# Patient Record
Sex: FEMALE | Race: WHITE | Employment: OTHER | URBAN - METROPOLITAN AREA
[De-identification: names, ages, dates, MRNs, and addresses within clinical notes are randomized per-mention and may not be internally consistent; named-entity substitution may affect disease eponyms.]

---

## 2017-01-30 ENCOUNTER — TELEPHONE (OUTPATIENT)
Dept: FAMILY MEDICINE | Facility: OTHER | Age: 62
End: 2017-01-30

## 2017-01-30 NOTE — Clinical Note
[facility]  [id number] [place] [address]  Phone: [phone]  January 30, 2017      Addis Ferreira  [address] [address] [address]      Dear Addis,    We care about your health and have reviewed your health plan including your medical conditions, medications, and lab results.  Based on this review, it is recommended that you follow up regarding the following health topic(s):  -Breast Cancer Screening  -Colon Cancer Screening    We recommend you take the following action(s):  -schedule a MAMMOGRAM which is due. Please disregard this reminder if you have had this exam elsewhere within the last 1-2 years please let us know so we can update your records.  -schedule a COLONOSCOPY to look for colon cancer (due every 10 years or 5 years in higher risk situations.)  Colonoscopies can prevent 90-95% of colon cancer deaths.  Problem lesions can be removed before they ever become cancer.  If you do not wish to do a colonoscopy or cannot afford to do one at this time, there is another option called a Fecal Immunochemical Occult Blood Test (FIT) a take home stool sample kit.  It does not replace the colonoscopy for colorectal cancer screening, but it can detect hidden bleeding in the lower colon.  It does need to be repeated every year and if a positive result is obtained, you would be referred for a colonoscopy.  If you have completed either one of these tests at another facility, please have the records sent to our clinic for our records.     Please call us at the [facility] - [phone] (or use [employer]) to address the above recommendations.     Thank you for trusting [facility] and we appreciate the opportunity to serve you.  We look forward to supporting your healthcare needs in the future.    Healthy Regards,    Your Health Care Team  [facility]

## 2017-01-30 NOTE — TELEPHONE ENCOUNTER
Panel Management Review      Patient has the following on her problem list:     Hypertension   Last three blood pressure readings:  BP Readings from Last 3 Encounters:   01/14/16 135/80   08/31/15 140/80   03/19/15 130/76     Blood pressure: Passed    HTN Guidelines:  Age 18-59 BP range:  Less than 140/90  Age 60-85 with Diabetes:  Less than 140/90  Age 60-85 without Diabetes:  less than 150/90      Composite cancer screening  Chart review shows that this patient is due/due soon for the following Mammogram and Fecal Colorectal (FIT)  Summary:    Patient is due/failing the following:   BMP, FIT and MAMMOGRAM    Action needed:   Patient needs non-fasting lab only appointment and schedule a mammogram and complete a FIT test     Type of outreach:    Sent letter.    Questions for provider review:    None                                                                                                                                    Chica King       Chart routed to Care Team .

## 2017-03-15 NOTE — PROGRESS NOTES
SUBJECTIVE:                                                    Addis Ferreira is a 62 year old female who presents to clinic today for the following health issues:      Hypertension Follow-up      Outpatient blood pressures are not being checked.    Low Salt Diet: no but trying, cooks from scratched, tries to stay away from processed foods.     BP high today.  Not checking elsewhere.  Denies side effects other than mild ACE cough.  Seems to improve if she hydrates better.      Getting her female hormones from her other physician, in [place] (OB/Gyn infertility)    Has a tooth that needs to be filled.  Has something wrong with her eyes.  Has a lump on her neck that has been there for a while.  This was previously partially removed, but has recurred.      Had a change in vision, has been some time.  Needs to see eye doctor.      Amount of exercise or physical activity: Works on her feet    Problems taking medications regularly: No    Medication side effects: none    Diet: regular (no restrictions)      Problem list and histories reviewed & adjusted, as indicated.  Additional history: as documented    Current Outpatient Prescriptions   Medication Sig Dispense Refill     lisinopril (PRINIVIL,ZESTRIL) 30 MG tablet Take 1 tablet (30 mg) by mouth daily 90 tablet 3     hydrochlorothiazide (HYDRODIURIL) 25 MG tablet Take 1 tablet (25 mg) by mouth daily 90 tablet 3     norethindrone-ethinyl estradiol (FEMHRT 1/5) 1-5 MG-MCG per tablet Take 1 tablet by mouth daily. 3 Package 3     conjugated estrogens (PREMARIN) vaginal cream Place  vaginally three times a week. 42.5 g 12     MULTI-DAY OR TABS   0     Recent Labs   Lab Test  01/14/16   0831  01/15/15   1028  01/15/15   1013  01/02/14   0917   LDL  119*  120   --   123   HDL  40*  42*   --   40*   TRIG  220*  207*   --   186*   ALT   --    --   25  27   CR  0.85   --   0.85  0.84   GFRESTIMATED  69   --   69  70   GFRESTBLACK  83   --   83  85   POTASSIUM  4.0   --   4.0   "4.4      BP Readings from Last 3 Encounters:   03/23/17 144/82   01/14/16 135/80   08/31/15 140/80    Wt Readings from Last 3 Encounters:   03/23/17 221 lb 3.2 oz (100.3 kg)   01/14/16 220 lb 4.8 oz (99.9 kg)   08/31/15 227 lb (103 kg)                  Reviewed and updated as needed this visit by clinical staff       Reviewed and updated as needed this visit by Provider         ROS:  Constitutional, HEENT, cardiovascular, pulmonary, gi and gu systems are negative, except as otherwise noted.    OBJECTIVE:                                                    /82 (BP Location: Right arm, Patient Position: Chair, Cuff Size: Adult Large)  Pulse 64  Temp 98.1  F (36.7  C) (Temporal)  Resp 16  Ht 5' 2.5\" (1.588 m)  Wt 221 lb 3.2 oz (100.3 kg)  LMP 06/30/2003  BMI 39.81 kg/m2  Body mass index is 39.81 kg/(m^2).  GENERAL: healthy, alert and no distress  EYES: suspicious for cataract in right eye.  NECK: cystic mass in right neck c/w hidradenoma previously identified  RESP: lungs clear to auscultation - no rales, rhonchi or wheezes  CV: regular rate and rhythm, normal S1 S2, no S3 or S4, no murmur, click or rub, no peripheral edema and peripheral pulses strong  ABDOMEN: soft, nontender, no hepatosplenomegaly, no masses and bowel sounds normal  MS: no gross musculoskeletal defects noted, no edema    Diagnostic Test Results:  Results for orders placed or performed in visit on 01/14/16   LIPID REFLEX TO DIRECT LDL PANEL   Result Value Ref Range    Cholesterol 203 (H) <200 mg/dL    Triglycerides 220 (H) <150 mg/dL    HDL Cholesterol 40 (L) >49 mg/dL    LDL Cholesterol Calculated 119 (H) <100 mg/dL    Non HDL Cholesterol 163 (H) <130 mg/dL   BASIC METABOLIC PANEL   Result Value Ref Range    Sodium 141 133 - 144 mmol/L    Potassium 4.0 3.4 - 5.3 mmol/L    Chloride 104 94 - 109 mmol/L    Carbon Dioxide 26 20 - 32 mmol/L    Anion Gap 11 3 - 14 mmol/L    Glucose 109 (H) 70 - 99 mg/dL    Urea Nitrogen 13 7 - 30 mg/dL    " "Creatinine 0.85 0.52 - 1.04 mg/dL    GFR Estimate 69 >60 mL/min/1.7m2    GFR Estimate If Black 83 >60 mL/min/1.7m2    Calcium 9.5 8.5 - 10.1 mg/dL      The 10-year ASCVD risk score (Rashaad KWONG Jr, et al., 2013) is: 7%    Values used to calculate the score:      Age: 62 years      Sex: Female      Is Non- : No      Diabetic: No      Tobacco smoker: No      Systolic Blood Pressure: 144 mmHg      Is BP treated: Yes      HDL Cholesterol: 40 mg/dL      Total Cholesterol: 203 mg/dL   ASSESSMENT/PLAN:                                                      BMI:   Estimated body mass index is 39.81 kg/(m^2) as calculated from the following:    Height as of this encounter: 5' 2.5\" (1.588 m).    Weight as of this encounter: 221 lb 3.2 oz (100.3 kg).   Weight management plan: Discussed healthy diet and exercise guidelines and patient will follow up in 6 months in clinic to re-evaluate.        ICD-10-CM    1. 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 Screen for colon cancer Z12.11 Fecal colorectal cancer screen (FIT)   4. Visit for screening mammogram Z12.31 MA SCREENING DIGITAL BILAT - Future  (s+30)   5. Need for hepatitis C screening test Z11.59 Hepatitis C Screen Reflex to HCV RNA Quant and Genotype   6. CARDIOVASCULAR SCREENING; LDL GOAL LESS THAN 160 Z13.6 Lipid Profile with reflex to direct LDL     1.  Currently Controlled.  Continue current regimen.  Check labs.  Call/return if any problems or questions arise.   2.  Discussed options at length.  Pt would like to see dermatology to get this definitively removed.  She understands this is benign, but it has already re-grown to it's pre-excision size and is bothering her more and more.  3.  Screening ordered.  4.  Screening ordered.  5.  Screening ordered.  6.  Will " check screening labs.            Patient Instructions   Thank you for visiting [facility]    Keep up the good work overall.    We'll let you know your lab results as soon as we can.     See dermatology for removal of your lump.  Let me know if concerns or problems.    See your eye doctor.  I suspect you have a cataract.    Please see me in 6-12 months for follow up.     You are also due for a mammogram.    If you had imaging scheduled please refer to your radiology prep sheet.    Appointment    Date_______________     Time_____________    Day:   M TU W TH F    With____________________________    Location_________________________    If you need medication refills, please contact your pharmacy 3 days before your prescriptions runs out. If you are out of refills, your pharmacy will contact contact the clinic.    Contact us or return if questions or concerns.     -Your Care Team:  MD Josey Leonard PA-C Folake Falaki, MD Anoshirvan Mazhari, MD Kelly White, DEDRICK    General information about your clinic      Clinic hours:     Lab hours:  Phone [phone]  Monday 7:30 am-7 pm    Monday 8:30 am-6:30 pm  Tuesday-Friday 7:30 am-5 pm   Tuesday-Friday 8:30 am-4:30 pm    Pharmacy hours:  Phone [phone]  Monday 8:30 am-7pm  Tuesday-Friday 8:30am-6 pm                                       Mychart assistance [phone]        We would like to hear from you, how was your visit today?    Radha Stewart  Patient Information Supervisor   Patient Care Supervisor  [facility], and [facility], and [facility]  [phone]         Omar Hand MD, MD  [facility]

## 2017-03-23 ENCOUNTER — OFFICE VISIT (OUTPATIENT)
Dept: FAMILY MEDICINE | Facility: OTHER | Age: 62
End: 2017-03-23
Payer: COMMERCIAL

## 2017-03-23 VITALS
WEIGHT: 221.2 LBS | HEART RATE: 64 BPM | BODY MASS INDEX: 39.19 KG/M2 | DIASTOLIC BLOOD PRESSURE: 72 MMHG | HEIGHT: 63 IN | TEMPERATURE: 98.1 F | SYSTOLIC BLOOD PRESSURE: 132 MMHG | RESPIRATION RATE: 16 BRPM

## 2017-03-23 DIAGNOSIS — Z11.59 NEED FOR HEPATITIS C SCREENING TEST: ICD-10-CM

## 2017-03-23 DIAGNOSIS — D23.9 CLEAR CELL HIDRADENOMA: ICD-10-CM

## 2017-03-23 DIAGNOSIS — I10 HYPERTENSION GOAL BP (BLOOD PRESSURE) < 140/90: Primary | ICD-10-CM

## 2017-03-23 DIAGNOSIS — Z12.31 VISIT FOR SCREENING MAMMOGRAM: ICD-10-CM

## 2017-03-23 DIAGNOSIS — Z13.6 CARDIOVASCULAR SCREENING; LDL GOAL LESS THAN 160: ICD-10-CM

## 2017-03-23 DIAGNOSIS — Z12.11 SCREEN FOR COLON CANCER: ICD-10-CM

## 2017-03-23 LAB
ANION GAP SERPL CALCULATED.3IONS-SCNC: 6 MMOL/L (ref 3–14)
BUN SERPL-MCNC: 13 MG/DL (ref 7–30)
CALCIUM SERPL-MCNC: 9.3 MG/DL (ref 8.5–10.1)
CHLORIDE SERPL-SCNC: 104 MMOL/L (ref 94–109)
CHOLEST SERPL-MCNC: 204 MG/DL
CO2 SERPL-SCNC: 30 MMOL/L (ref 20–32)
CREAT SERPL-MCNC: 0.76 MG/DL (ref 0.52–1.04)
GFR SERPL CREATININE-BSD FRML MDRD: 78 ML/MIN/1.7M2
GLUCOSE SERPL-MCNC: 104 MG/DL (ref 70–99)
HDLC SERPL-MCNC: 37 MG/DL
LDLC SERPL CALC-MCNC: 135 MG/DL
NONHDLC SERPL-MCNC: 167 MG/DL
POTASSIUM SERPL-SCNC: 4 MMOL/L (ref 3.4–5.3)
SODIUM SERPL-SCNC: 140 MMOL/L (ref 133–144)
TRIGL SERPL-MCNC: 160 MG/DL

## 2017-03-23 PROCEDURE — 80061 LIPID PANEL: CPT | Performed by: FAMILY MEDICINE

## 2017-03-23 PROCEDURE — 99214 OFFICE O/P EST MOD 30 MIN: CPT | Performed by: FAMILY MEDICINE

## 2017-03-23 PROCEDURE — 36415 COLL VENOUS BLD VENIPUNCTURE: CPT | Performed by: FAMILY MEDICINE

## 2017-03-23 PROCEDURE — 86803 HEPATITIS C AB TEST: CPT | Performed by: FAMILY MEDICINE

## 2017-03-23 PROCEDURE — 80048 BASIC METABOLIC PNL TOTAL CA: CPT | Performed by: FAMILY MEDICINE

## 2017-03-23 RX ORDER — LISINOPRIL 30 MG/1
30 TABLET ORAL DAILY
Qty: 90 TABLET | Refills: 3 | Status: SHIPPED | OUTPATIENT
Start: 2017-03-23 | End: 2018-03-15

## 2017-03-23 RX ORDER — HYDROCHLOROTHIAZIDE 25 MG/1
25 TABLET ORAL DAILY
Qty: 90 TABLET | Refills: 3 | Status: SHIPPED | OUTPATIENT
Start: 2017-03-23 | End: 2018-03-15

## 2017-03-23 ASSESSMENT — PAIN SCALES - GENERAL: PAINLEVEL: NO PAIN (0)

## 2017-03-23 NOTE — MR AVS SNAPSHOT
After Visit Summary   3/23/2017    Addis Ferreira    MRN: [id number]           Patient Information     Date Of Birth          1955        Visit Information        Provider Department      3/23/2017 9:15 AM Omar Hand MD [facility]        Today's Diagnoses     Hypertension goal BP (blood pressure) < 140/90    -  1    Clear cell hidradenoma        Screen for colon cancer        Visit for screening mammogram        Need for hepatitis C screening test        CARDIOVASCULAR SCREENING; LDL GOAL LESS THAN 160          Care Instructions    Thank you for visiting [facility]    Keep up the good work overall.    We'll let you know your lab results as soon as we can.     See dermatology for removal of your lump.  Let me know if concerns or problems.    See your eye doctor.  I suspect you have a cataract.    Please see me in 6-12 months for follow up.     You are also due for a mammogram.    If you had imaging scheduled please refer to your radiology prep sheet.    Appointment    Date_______________     Time_____________    Day:   M TU W TH F    With____________________________    Location_________________________    If you need medication refills, please contact your pharmacy 3 days before your prescriptions runs out. If you are out of refills, your pharmacy will contact contact the clinic.    Contact us or return if questions or concerns.     -Your Care Team:  MD Josey Leonard PA-C Folake Falaki, MD Anoshirvan Mazhari, MD Kelly White, DEDRICK    General information about your clinic      Clinic hours:     Lab hours:  Phone [phone]  Monday 7:30 am-7 pm    Monday 8:30 am-6:30 pm  Tuesday-Friday 7:30 am-5 pm   Tuesday-Friday 8:30 am-4:30 pm    Pharmacy hours:  Phone [phone]  Monday 8:30 am-7pm  Tuesday-Friday 8:30am-6 pm                                       Mychart assistance [phone]        We would like to hear from you,  how was your visit today?    Radha Stewart  Patient Information Supervisor   Patient Care Supervisor  [facility] [place], and [facility] [place], and [facility]  [phone]           Follow-ups after your visit        Additional Services     DERMATOLOGY REFERRAL       Your provider has referred you to: [facility]: [facility] [phone]   [email or web address]    Please be aware that coverage of these services is subject to the terms and limitations of your health insurance plan.  Call member services at your health plan with any benefit or coverage questions.      Please bring the following with you to your appointment:    (1) Any X-Rays, CTs or MRIs which have been performed.  Contact the facility where they were done to arrange for  prior to your scheduled appointment.    (2) List of current medications  (3) This referral request   (4) Any documents/labs given to you for this referral                  Future tests that were ordered for you today     Open Future Orders        Priority Expected Expires Ordered    MA SCREENING DIGITAL BILAT - Future  (s+30) Routine  3/15/2018 3/23/2017    Fecal colorectal cancer screen (FIT) Routine 4/13/2017 6/15/2017 3/23/2017            Who to contact     If you have questions or need follow up information about today's clinic visit or your schedule please contact [facility] directly at [phone].  Normal or non-critical lab and imaging results will be communicated to you by MyChart, letter or phone within 4 business days after the clinic has received the results. If you do not hear from us within 7 days, please contact the clinic through MyChart or phone. If you have a critical or abnormal lab result, we will notify you by phone as soon as possible.  Submit refill requests through [employer] or call your pharmacy  "and they will forward the refill request to us. Please allow 3 business days for your refill to be completed.          Additional Information About Your Visit        MyChart Information     LoungeUp lets you send messages to your doctor, view your test results, renew your prescriptions, schedule appointments and more. To sign up, go to [email or web address] . Click on \"Log in\" on the left side of the screen, which will take you to the Welcome page. Then click on \"Sign up Now\" on the right side of the page.     You will be asked to enter the access code listed below, as well as some personal information. Please follow the directions to create your username and password.     Your access code is: [email or web address]  Expires: 2017  9:45 AM     Your access code will  in 90 days. If you need help or a new code, please call your [place] clinic or [phone].        Care EveryWhere ID     This is your [facility] EveryWhere ID. This could be used by other organizations to access your [place] medical records  [id number]        Your Vitals Were     Pulse Temperature Respirations Height Last Period BMI (Body Mass Index)    64 98.1  F (36.7  C) (Temporal) 16 5' 2.5\" (1.588 m) 2003 39.81 kg/m2       Blood Pressure from Last 3 Encounters:   17 132/72   16 135/80   08/31/15 140/80    Weight from Last 3 Encounters:   17 221 lb 3.2 oz (100.3 kg)   16 220 lb 4.8 oz (99.9 kg)   08/31/15 227 lb (103 kg)              We Performed the Following     BASIC METABOLIC PANEL     DERMATOLOGY REFERRAL     Hepatitis C Screen Reflex to HCV RNA Quant and Genotype     Lipid Profile with reflex to direct LDL        Primary Care Provider Office Phone # Fax #    Omar Hand [phone]       [facility] [address] [place] [address]        Thank you!     Thank you for choosing [facility]  for your care. Our goal is always to provide you with excellent care. " Hearing back from our patients is one way we can continue to improve our services. Please take a few minutes to complete the written survey that you may receive in the mail after your visit with us. Thank you!             Your Updated Medication List - Protect others around you: Learn how to safely use, store and throw away your medicines at www.disposemymeds.org.          This list is accurate as of: 3/23/17  9:45 AM.  Always use your most recent med list.                   Brand Name Dispense Instructions for use    hydrochlorothiazide 25 MG tablet    HYDRODIURIL    90 tablet    Take 1 tablet (25 mg) by mouth daily       lisinopril 30 MG tablet    PRINIVIL,ZESTRIL    90 tablet    Take 1 tablet (30 mg) by mouth daily       MULTI-DAY Tabs          norethindrone-ethinyl estradiol 1-5 MG-MCG per tablet    FEMHRT 1/5    3 Package    Take 1 tablet by mouth daily.       PREMARIN cream   Generic drug:  conjugated estrogens     42.5 g    Place  vaginally three times a week.

## 2017-03-23 NOTE — PATIENT INSTRUCTIONS
Thank you for visiting [facility]    Keep up the good work overall.    We'll let you know your lab results as soon as we can.     See dermatology for removal of your lump.  Let me know if concerns or problems.    See your eye doctor.  I suspect you have a cataract.    Please see me in 6-12 months for follow up.     You are also due for a mammogram.    If you had imaging scheduled please refer to your radiology prep sheet.    Appointment    Date_______________     Time_____________    Day:   M TU W TH F    With____________________________    Location_________________________    If you need medication refills, please contact your pharmacy 3 days before your prescriptions runs out. If you are out of refills, your pharmacy will contact contact the clinic.    Contact us or return if questions or concerns.     -Your Care Team:  MD Josey Leonard PA-C Folake Falaki, MD Anoshirvan Mazhari, MD Kelly White, CNP    General information about your clinic      Clinic hours:     Lab hours:  Phone [phone]  Monday 7:30 am-7 pm    Monday 8:30 am-6:30 pm  Tuesday-Friday 7:30 am-5 pm   Tuesday-Friday 8:30 am-4:30 pm    Pharmacy hours:  Phone [phone]  Monday 8:30 am-7pm  Tuesday-Friday 8:30am-6 pm                                       Mychart assistance [phone]        We would like to hear from you, how was your visit today?    Radha Stewart  Patient Information Supervisor   Patient Care Supervisor  [facility] [place], and [facility] [place], and [facility]  [phone]

## 2017-03-23 NOTE — NURSING NOTE
"Chief Complaint   Patient presents with     Recheck Medication     Hydrocholorothiazide, lisinopril     Health Maintenance     mychart, height, mammogram, hep c, bmp       Initial /82 (BP Location: Right arm, Patient Position: Chair, Cuff Size: Adult Large)  Pulse 64  Temp 98.1  F (36.7  C) (Temporal)  Resp 16  Ht 5' 2.5\" (1.588 m)  Wt 221 lb 3.2 oz (100.3 kg)  LMP 06/30/2003  BMI 39.81 kg/m2 Estimated body mass index is 39.81 kg/(m^2) as calculated from the following:    Height as of this encounter: 5' 2.5\" (1.588 m).    Weight as of this encounter: 221 lb 3.2 oz (100.3 kg).  Medication Reconciliation: complete  Kwasi Elizondo, TONI    "

## 2017-03-23 NOTE — PROGRESS NOTES
Your cholesterol looks slightly worse this year.  I'd recommend working on losing weight and increasing exercise.  We should consider cholesterol medication if this isn't improving over time.  Your 10-year risk of heart disease is about 6 percent based on current age.    Your blood sugar is in the prediabetic range.  Can try to prevent this from getting worse by exercising regularly and controlling weight and diet.    Other labs are great!      Have a nice day!    Dr. Hand    The 10-year ASCVD risk score (Rashaad KWONG Jr, et al., 2013) is: 6.2%    Values used to calculate the score:      Age: 62 years      Sex: Female      Is Non- : No      Diabetic: No      Tobacco smoker: No      Systolic Blood Pressure: 132 mmHg      Is BP treated: Yes      HDL Cholesterol: 37 mg/dL      Total Cholesterol: 204 mg/dL

## 2017-03-24 LAB — HCV AB SERPL QL IA: NORMAL

## 2017-03-28 PROBLEM — D23.9: Status: ACTIVE | Noted: 2017-03-28

## 2017-05-17 ENCOUNTER — TELEPHONE (OUTPATIENT)
Dept: DERMATOLOGY | Facility: CLINIC | Age: 62
End: 2017-05-17

## 2017-05-17 NOTE — TELEPHONE ENCOUNTER
Left message for patient regarding upcoming appointment on 5/22/17 at 10:00am.  Informed patient to bring an updated list of allergies, medications, pharmacy details and insurance information. Asked patient to bring any dermatology records from outside [place] or the [facility] or have them faxed to [phone].    Patient Reminders Given:  --Plan on being in our facility for approximately one hour, this includes the registration process, office visit, education and check-out process.  If you are having a procedure, more time may be required.     --If you are having a procedure, please, present 15 minutes early.  --We are located on the second floor of the building, check in desk 4.   --If you need to cancel or reschedule, call [phone].  --We look forward to seeing you in Dermatology Clinic.       Feliciano Parker Medical Assistant

## 2017-05-22 ENCOUNTER — OFFICE VISIT (OUTPATIENT)
Dept: DERMATOLOGY | Facility: CLINIC | Age: 62
End: 2017-05-22
Attending: FAMILY MEDICINE
Payer: COMMERCIAL

## 2017-05-22 DIAGNOSIS — D23.9 CLEAR CELL HIDRADENOMA: Primary | ICD-10-CM

## 2017-05-22 PROCEDURE — 99202 OFFICE O/P NEW SF 15 MIN: CPT | Performed by: DERMATOLOGY

## 2017-05-22 ASSESSMENT — PAIN SCALES - GENERAL: PAINLEVEL: NO PAIN (0)

## 2017-05-22 NOTE — PATIENT INSTRUCTIONS

## 2017-05-22 NOTE — PROGRESS NOTES
"[facility] Dermatology Note      Dermatology Problem List:  1. Benign skin adnexal tumor, favor clear cell hidradenoma, incompletely excised, right anterior of neck  -s/p incomplete excision 3/12/2015, pending re-excision.    Encounter Date: May 22, 2017    CC:  Chief Complaint   Patient presents with     Derm Problem     tumor on neck- growing in size          History of Present Illness:  Ms. Addis Ferreira is a 62 year old female who presents as a referral from Dr. Hand for lesion on the neck. Today, the patient reports that the lesion on the right neck has returned. She could feel \"there was something left\" for the past two years. The lesion has grown since the original incomplete excision but hasn't bleed by itself. The patient reports no other lesions of concern.    Past Medical History:   Patient Active Problem List   Diagnosis     Irregular menstrual cycle     Pityriasis rosea     Hypertension goal BP (blood pressure) < 140/90     CARDIOVASCULAR SCREENING; LDL GOAL LESS THAN 160     Advanced directives, counseling/discussion     Clear cell hidradenoma     Past Medical History:   Diagnosis Date     Irregular menstrual cycle      Past Surgical History:   Procedure Laterality Date     HC DILATION/CURETTAGE DIAG/THER NON OB  1998    after miscarriage     Social History:  The patient works in a bakery. The patient denies use of tanning beds. The patient has 0-4 alcoholic drinks per week, does not smoke or use tobacco.    Family History:  There is no family history of skin cancer.    Medications:  Current Outpatient Prescriptions   Medication Sig Dispense Refill     lisinopril (PRINIVIL,ZESTRIL) 30 MG tablet Take 1 tablet (30 mg) by mouth daily 90 tablet 3     hydrochlorothiazide (HYDRODIURIL) 25 MG tablet Take 1 tablet (25 mg) by mouth daily 90 tablet 3     norethindrone-ethinyl estradiol (FEMHRT 1/5) 1-5 MG-MCG per tablet Take 1 tablet by mouth daily. 3 Package 3     conjugated estrogens " (PREMARIN) vaginal cream Place  vaginally three times a week. 42.5 g 12     MULTI-DAY OR TABS   0     Allergies   Allergen Reactions     No Known Drug Allergies      Review of Systems:  -Skin/Heme New Pt: The patient denies frequent sun exposure. The patient denies excessive scarring or problems healing except as per HPI. The patient denies excessive bleeding.  -Const: No fever or chills    Physical exam:  Vitals: LMP 06/30/2003  GEN: This is a well-nourished, well developed female in no acute distress  NEURO: Alert and oriented  PSYCH: in a pleasant mood, appropriate affect  SKIN: Focused examination of the anterior neck was performed.  -Exophytic cystic tumor measuring 1.5cm X 1.5cm on the right anterior neck. Exophytic portion has translucent appearance.   -No other lesions of concern on areas examined.     Impression/Plan:  1. Clear Cell Hidradenoma, biopsy proven, benign. Lesion has recurred s/p incomplete excision 3/12/2015. Will schedule with dermatologic surgeon for excision. Photo taken today.    Plan to return for excision of the lesion.     CC Dr. Omar Hand on close of this encounter.  Follow-up June 5 with Dr. Boucher, earlier for new or changing lesions.     Staff Involved:  Scribe/Staff    Scribe Disclosure:   I, Rebekah Redmond, am serving as a scribe to document services personally performed by Dr. Pramod Martin, based on data collection and the provider's statements to me.     Provider Disclosure:   I have reviewed the documentation recorded by the scribe and have edited it as needed. I have personally performed the services documented here and the documentation accurately represents those services and the decisions made by me.     Pramod Martin MD, MS    Department of Dermatology  [facility]: Phone: [phone], Fax:[phone]  [facility]: Phone: [phone], Fax:  [phone]

## 2017-05-22 NOTE — MR AVS SNAPSHOT
After Visit Summary   5/22/2017    Addis Ferreira    MRN: [id number]           Patient Information     Date Of Birth          1955        Visit Information        Provider Department      5/22/2017 10:00 AM Pramod Martin MD [facility]        Today's Diagnoses     Clear cell hidradenoma    -  1      Care Instructions    Excision Wound Care Instructions  I will experience scar, altered skin color, bleeding, swelling, pain, crusting and redness. I may experience altered sensation. Risks are excessive bleeding, infection, muscle weakness, thick (hypertrophic or keloidal) scar, and recurrence,. A second procedure may be recommended to obtain the best cosmetic or functional result.  Possible complications of any surgical procedure are bleeding, infection, scarring, alteration in skin color and sensation, muscle weakness in the area, wound dehiscence or seperation, or recurrence of the lesion or disease. On occasion, after healing, a secondary procedure or revision may be recommended in order to obtain the best cosmetic or functional result.   After your surgery, a pressure bandage will be placed over the area that has sutures. This will help prevent bleeding. Please follow these instructions until you come back to clinic for suture removal on 2 weeks, as they will help you to prevent complications as your wound heals.  For the First 48 hours After Surgery:  1. Leave the pressure bandage on and keep it dry. If it should come loose, you may retape it, but do not take it off.  2. Relax and take it easy. Do not do any vigorous exercise, heavy lifting, or bending forward. This could cause the wound to bleed.  3. Post-operative pain is usually mild. You may take plain or extra strength Tylenol every 4 hours as needed (do not take more than 4,000mg in one day). Do not take any medicine that contains aspirin, ibuprofen or motrin unless you have been recommended these by a doctor.  Avoid  alcohol and vitamin E as these may increase your tendency to bleed.  4. You may put an ice pack around the bandaged area for 20 minutes every 2-3 hours. This may help reduce swelling, bruising, and pain. Make sure the ice pack is waterproof so that the pressure bandage does not get wet.   5. You may see a small amount of drainage or blood on your pressure bandage. This is normal. However, if drainage or bleeding continues or saturates the bandage, you will need to apply firm pressure over the bandage with a washcloth for 15 minutes. If bleeding continues after applying pressure for 15 minutes then go to the nearest emergency room.  48 Hours After Surgery  Carefully remove the bandage and start daily wound care and dressing changes. You may also now shower and get the wound wet. Wash wound with a mild soap and water.  Use caution when washing the wound. Be gentle and do not let the forceful shower stream hit the wound directly.  PAT dry.  Daily Wound Care:  1. Wash wound with a mild soap and water.  Use caution when washing the wound, be gentle and do not let the forceful shower stream hit the wound directly.  2. PAT DRY.  3. Apply Vaseline (from a new container or tube) over the suture line with a Q-tip. It is very important to keep the wound continuously moist, as wounds heal best in a moist environment.  4.  Keep the site covered until sutures are removed, you can cover it with a Telfa (non-stick) dressing and tape or a band-aid.    5. If you are unable to keep wound covered, you must apply Vaseline every 2 - 3 hours (while awake) to ensure it is being kept moist for optimal healing. A dressing overnight is recommended to keep the area moist.   Call Us If:  1. You have pain that is not controlled with Tylenol.  2. You have signs or symptoms of an infection, such as: fever over 100 degrees F, redness, warmth, or foul-smelling or yellow/creamy drainage from the wound.  Who should I call with questions?    [place]  [facility]: [phone]     [facility]: [phone]    For urgent needs outside of business hours call the [facility] at [phone] and ask for the dermatology resident on call                    Follow-ups after your visit        Your next 10 appointments already scheduled     2017  8:30 AM CDT   PROCEDURE with Jazzy Boucher MD   [facility] ([facility]    [address] [place] [id number] [phone]              Who to contact     If you have questions or need follow up information about today's clinic visit or your schedule please contact [facility] directly at [phone].  Normal or non-critical lab and imaging results will be communicated to you by MyChart, letter or phone within 4 business days after the clinic has received the results. If you do not hear from us within 7 days, please contact the clinic through MyChart or phone. If you have a critical or abnormal lab result, we will notify you by phone as soon as possible.  Submit refill requests through [employer] or call your pharmacy and they will forward the refill request to us. Please allow 3 business days for your refill to be completed.          Additional Information About Your Visit        [employer] Information     [employer] is an electronic gateway that provides easy, online access to your medical records. With [employer], you can request a clinic appointment, read your test results, renew a prescription or communicate with your care team.     To sign up for [employer] visit the website at [email or web address]   You will be asked to enter the access code listed below, as well as some personal information. Please follow the directions to create your username and password.     Your access code is: [email or web address]  Expires: 2017  9:45 AM     Your access code will  in 90 days. If you need help or a new  code, please contact your [facility] or call [phone] for assistance.        Care EveryWhere ID     This is your Care EveryWhere ID. This could be used by other organizations to access your [facility] medical records  [id number]        Your Vitals Were     Last Period                   06/30/2003            Blood Pressure from Last 3 Encounters:   03/23/17 132/72   01/14/16 135/80   08/31/15 140/80    Weight from Last 3 Encounters:   03/23/17 100.3 kg (221 lb 3.2 oz)   01/14/16 99.9 kg (220 lb 4.8 oz)   08/31/15 103 kg (227 lb)              Today, you had the following     No orders found for display       Primary Care Provider Office Phone # Fax #    Omar Hand [phone]       [facility] [address] [place] [address]        Thank you!     Thank you for choosing [facility]  for your care. Our goal is always to provide you with excellent care. Hearing back from our patients is one way we can continue to improve our services. Please take a few minutes to complete the written survey that you may receive in the mail after your visit with us. Thank you!             Your Updated Medication List - Protect others around you: Learn how to safely use, store and throw away your medicines at www.disposemymeds.org.          This list is accurate as of: 5/22/17 10:38 AM.  Always use your most recent med list.                   Brand Name Dispense Instructions for use    hydrochlorothiazide 25 MG tablet    HYDRODIURIL    90 tablet    Take 1 tablet (25 mg) by mouth daily       lisinopril 30 MG tablet    PRINIVIL,ZESTRIL    90 tablet    Take 1 tablet (30 mg) by mouth daily       MULTI-DAY Tabs          norethindrone-ethinyl estradiol 1-5 MG-MCG per tablet    FEMHRT 1/5    3 Package    Take 1 tablet by mouth daily.       PREMARIN cream   Generic drug:  conjugated estrogens     42.5 g    Place  vaginally three times a week.

## 2017-05-22 NOTE — NURSING NOTE
Dermatology Rooming Note    Addis Ferreira's goals for this visit include:   Chief Complaint   Patient presents with     Derm Problem     tumor on neck- growing in size        Is a scribe okay for this visit: yes    Are records needed for this visit(If yes, obtain release of information): No     Vitals: LMP 06/30/2003    Referring Provider:  Omar Hand MD  [facility]  [address] [place] [address]

## 2017-05-22 NOTE — LETTER
"5/22/2017       RE: Addis Ferreira  [address] [address] [place] [id number]     Dear Colleague,    Thank you for referring your patient, Addis Ferreira, to the [facility]. Please see a copy of my visit note below.    [facility] Dermatology Note      Dermatology Problem List:  1. Benign skin adnexal tumor, favor clear cell hidradenoma, incompletely excised, right anterior of neck  -s/p incomplete excision 3/12/2015, pending re-excision.    Encounter Date: May 22, 2017    CC:  Chief Complaint   Patient presents with     Derm Problem     tumor on neck- growing in size          History of Present Illness:  Ms. Addis Ferreira is a 62 year old female who presents as a referral from Dr. Hand for lesion on the neck. Today, the patient reports that the lesion on the right neck has returned. She could feel \"there was something left\" for the past two years. The lesion has grown since the original incomplete excision but hasn't bleed by itself. The patient reports no other lesions of concern.    Past Medical History:   Patient Active Problem List   Diagnosis     Irregular menstrual cycle     Pityriasis rosea     Hypertension goal BP (blood pressure) < 140/90     CARDIOVASCULAR SCREENING; LDL GOAL LESS THAN 160     Advanced directives, counseling/discussion     Clear cell hidradenoma     Past Medical History:   Diagnosis Date     Irregular menstrual cycle      Past Surgical History:   Procedure Laterality Date     HC DILATION/CURETTAGE DIAG/THER NON OB  1998    after miscarriage     Social History:  The patient works in a bakery. The patient denies use of tanning beds. The patient has 0-4 alcoholic drinks per week, does not smoke or use tobacco.    Family History:  There is no family history of skin cancer.    Medications:  Current Outpatient Prescriptions   Medication Sig Dispense Refill     lisinopril (PRINIVIL,ZESTRIL) 30 MG tablet " Take 1 tablet (30 mg) by mouth daily 90 tablet 3     hydrochlorothiazide (HYDRODIURIL) 25 MG tablet Take 1 tablet (25 mg) by mouth daily 90 tablet 3     norethindrone-ethinyl estradiol (FEMHRT 1/5) 1-5 MG-MCG per tablet Take 1 tablet by mouth daily. 3 Package 3     conjugated estrogens (PREMARIN) vaginal cream Place  vaginally three times a week. 42.5 g 12     MULTI-DAY OR TABS   0     Allergies   Allergen Reactions     No Known Drug Allergies      Review of Systems:  -Skin/Heme New Pt: The patient denies frequent sun exposure. The patient denies excessive scarring or problems healing except as per HPI. The patient denies excessive bleeding.  -Const: No fever or chills    Physical exam:  Vitals: LMP 06/30/2003  GEN: This is a well-nourished, well developed female in no acute distress  NEURO: Alert and oriented  PSYCH: in a pleasant mood, appropriate affect  SKIN: Focused examination of the anterior neck was performed.  -Exophytic cystic tumor measuring 1.5cm X 1.5cm on the right anterior neck. Exophytic portion has translucent appearance.   -No other lesions of concern on areas examined.     Impression/Plan:  1. Clear Cell Hidradenoma, biopsy proven, benign. Lesion has recurred s/p incomplete excision 3/12/2015. Will schedule with dermatologic surgeon for excision. Photo taken today.    Plan to return for excision of the lesion.     CC Dr. Omar Hand on close of this encounter.  Follow-up June 5 with Dr. Boucher, earlier for new or changing lesions.     Staff Involved:  Scribe/Staff    Scribe Disclosure:   I, Rebekah Redmond, am serving as a scribe to document services personally performed by Dr. Pramod Martin, based on data collection and the provider's statements to me.     Provider Disclosure:   I have reviewed the documentation recorded by the scribe and have edited it as needed. I have personally performed the services documented here and the documentation accurately represents those services and the decisions made by  me.     Pramod Martin MD, MS    Department of Dermatology  [facility]: Phone: [phone], Fax:[phone]  [facility]: Phone: [phone], Fax: [phone]      Again, thank you for allowing me to participate in the care of your patient.      Sincerely,    Pramod Martin MD

## 2017-06-05 ENCOUNTER — OFFICE VISIT (OUTPATIENT)
Dept: DERMATOLOGY | Facility: CLINIC | Age: 62
End: 2017-06-05
Payer: COMMERCIAL

## 2017-06-05 VITALS — OXYGEN SATURATION: 98 % | DIASTOLIC BLOOD PRESSURE: 79 MMHG | SYSTOLIC BLOOD PRESSURE: 169 MMHG | HEART RATE: 68 BPM

## 2017-06-05 DIAGNOSIS — D23.9 CLEAR CELL HIDRADENOMA: Primary | ICD-10-CM

## 2017-06-05 PROCEDURE — 12042 INTMD RPR N-HF/GENIT2.6-7.5: CPT | Performed by: DERMATOLOGY

## 2017-06-05 PROCEDURE — 88305 TISSUE EXAM BY PATHOLOGIST: CPT | Performed by: DERMATOLOGY

## 2017-06-05 PROCEDURE — 11423 EXC H-F-NK-SP B9+MARG 2.1-3: CPT | Performed by: DERMATOLOGY

## 2017-06-05 NOTE — PATIENT INSTRUCTIONS
Excision Wound Care Instructions with Steri strips      I will experience scar, altered skin color, bleeding, swelling, pain, crusting and redness. I may experience altered sensation. Risks are excessive bleeding, infection, muscle weakness, thick (hypertrophic or keloidal) scar, and recurrence. A second procedure may be recommended to obtain the best cosmetic or functional result.    Possible complications of any surgical procedure are bleeding, infection, scarring, alteration in skin color and sensation, muscle weakness in the area, wound dehiscence or seperation, or recurrence of the lesion or disease. On occasion, after healing, a secondary procedure or revision may be recommended in order to obtain the best cosmetic or functional result.       After your surgery, a pressure bandage will be placed over the area that has sutures. This will help prevent bleeding. Please, follow these instructions as they will help you to prevent complications as your wound heals.        For the First 48 hours After Surgery:    1. Leave the pressure bandage on and keep it dry. If it should come loose, you may retape it, but do not take it off.    2. Relax and take it easy. Do not do any vigorous exercise, heavy lifting, or bending forward. This could cause the wound to bleed.    3. Post-operative pain is usually mild. You may take plain or extra strength Tylenol every 4 hours as needed (do not take more than 4,000mg in one day) if you have no liver problems and your doctor says it is okay. Do not take any medicine that contains aspirin, ibuprofen or motrin unless you have been recommended these by a doctor.  Avoid alcohol and vitamin E as these may increase your tendency to bleed.    4. You may put an ice pack around the bandaged area for 20 minutes every 2-3 hours. This may help reduce swelling, bruising, and pain. Make sure the ice pack is waterproof so that the pressure bandage does not get wet.     5. You may see a small amount  of drainage or blood on your pressure bandage. This is normal. However, if drainage or bleeding continues or saturates the bandage, you will need to apply firm pressure over the bandage with a washcloth for 15 minutes. If bleeding continues after applying pressure for 15 minutes then go to the nearest emergency room.        48 Hours After Surgery:    Carefully remove the outer pressure bandage but leave the steri strips in place for these will continue to give the wound edges extra support as they are healing. You may trim the edges of the steri strip that have curled up but do not remove steri strips until they fall off. You may also now shower but do not saturate the wound or steri strips for this will cause your steri strips to fall off. You should cover the steri strips and wound with a wash cloth while showering to protect the area from water. It is ok if the steri strips become a bit damp but do not saturate.     Daily Wound Care:    1. Once the steri strips fall off wash wound with a mild soap and water.  Use caution when washing the wound, be gentle and do not let the forceful shower stream hit the wound directly. DO NOT WASH WITH HYDROGEN PEROXIDE FOR THIS MIGHT CAUSE THE SUTURES TO DISSOLVE FASTER THAN WHAT WE WANT.     2. PAT DRY.    3. Once steri strips fall off apply Vaseline (from a new container or tube) over the suture line with a Q-tip until it is completely healed. It is very important to keep the wound continuously moist, as wounds heal best in a moist environment.    4. Keep the site covered until site is healed, you can cover it with a Telfa (non-stick) dressing and tape or a band-aid. While your steri strips are on you can use them as your bandage.    5. Once steri strips fall off if you are unable to keep wound covered, you must apply Vaseline every 2 - 3 hours (while awake) to ensure it is being kept moist for optimal healing until completely healed. A dressing overnight is recommended to  keep the area moist.        Call Us If:    1. You have pain that is not controlled with Tylenol.    2. You have signs or symptoms of an infection, such as: fever over 100 degrees F, redness, warmth, or foul-smelling or yellow/creamy drainage from the wound.        Who should I call with questions?  [facility]: [phone]   [facility]: [phone]  For urgent needs outside of business hours call the [facility] at [phone] and ask for the dermatology resident on call

## 2017-06-05 NOTE — LETTER
6/5/2017      RE: Addis Ferreira  [address] [address] [place] [id number]       DERMATOLOGY EXCISION PROCEDURE NOTE    Dermatology Problem List:  1. 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 Excision was indicated. We discussed the principles of treatment and most likely complications including scarring, bleeding, infection, incomplete excision, wound dehiscence, pain, nerve damage, and recurrence. Informed consent was obtained and the patient underwent the procedure as follows:    PROCEDURE: The patient was taken to the operative suite. Time-out was performed.  The treatment area was anesthetized with 1% lidocaine with epinephrine. The area was prepped with Chlorhexidine and rinsed with sterile saline and draped with sterile towels. The lesion was delineated and excised down to subcutaneous fat in a fusiform manner. Hemostasis was obtained by electrocoagulation.     REPAIR: An intermediate layered linear closure was selected as the procedure which would maximally preserve both function and cosmesis.    After the excision of the tumor, the area was carefully undermined. Hemostasis was obtained with spot electrocoagulation.  Closure was oriented so that the wound was in the patient's natural skin tension lines. The subcutaneous and dermal layers were then closed with 4-0 vicryl sutures. The epidermis was then carefully approximated along the length of the wound using 4-0 monocryl running subcuticular sutures.     The final wound length was 4.8 cm. A total of 6 ml of  anesthesia was administered for all surgical sites. Estimated blood loss was less than 10 ml for all surgical sites. A sterile pressure dressing was applied and wound care instructions, with a written handout, were given. The patient was [facility] alert and ambulatory.    Dr. Boucher was immediately available for the entire surgery and was physicially present for the key portions of the procedure.    Anatomic Pathology Results: pending    Clinical Follow-Up: pending pathology result    Staff Involved:  Staff Only    Jazzy Boucher MD    Department of Dermatology  [facility]                  Jazzy Boucher MD

## 2017-06-05 NOTE — LETTER
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 No additional treatment is needed. If you have further questions or concerns, please contact the clinic at [phone].       Sincerely,     Kamilah Boucher MD      Dermatology Department of Dermatology   [facility]       Resulted Orders   Surgical pathology exam   Result Value Ref Range    Copath Report       Patient Name: BHAVANA FERREIRA  MR#: [id number]  Specimen #: [id number]  Collected: 2017  Received: 2017  Reported: 2017 19:01  Ordering Phy(s): KAMILAH BOUCHER    For improved result formatting, select 'View Enhanced Report Format'  under Linked Documents section.    SPECIMEN(S):  Skin, right neck    FINAL DIAGNOSIS:  Skin, neck, right:  - Nodular hidradenoma - (see description)    I have personally reviewed all specimens and or slides, including the  listed special stains, and used them with my medical judgement to  determine the final diagnosis.    Electronically signed out by:    Chalo Suarez M.D., [facility]    CLINICAL HISTORY:  The patient is a 62-year-old female.    GROSS:  The specimen is received in formalin with proper patient identification,  labeled \"R. neck\", and consists of a 3.6 x 1.9 cm pale tan skin ellipse,  excised to a greatest depth of 0.8 cm.  No orientation is provided.  At  the pericentral skin surface is a 0.9 x 0.7 cm tan, centrally red,   nodule-like lesion which is raised from the skin surface up to 0.7 cm.  Within the subcutaneous tissue deep to the surface lesion is a 1.1 x 0.8  x 0.8 cm well-circumscribed, rubbery lesion with two central cavitary  areas, one of " which contains a yellow-tinged, thin fluid and the other  of which contains a blood-tinged, thin fluid.  This rubbery lesion  focally abuts the margin of resection.  Within the tip of the skin  surface lesion is a 0.6 cm smooth-lined cavity which contains a  blood-tinged, thin fluid.  The margins are inked blue, and the specimen  is serially sectioned and entirely submitted as follows:    Summary of Sections:  1 - skin tips  2-6 - central aspect of skin, submitted in sequence (Dictated by: Mounika Gastelum 6/6/2017 01:41 PM)    MICROSCOPIC:  In the dermis and subcutis is a neoplasm which has a nodular growth  pattern.  This neoplasm appears relatively circumscribed.  The neoplasm  consists predominantly of squamoid cells having eosinophilic cytoplasm,  regular , oval nuclei, and well defined cell borders.  Also present are  areas of cells having clear cytoplasm.  Ducts and cysts are present  within the neoplasm.  Many of the cells lining these ducts are columnar  and show apocrine secretion.  Focally, cells having mucinous cytoplasm  are present.  Occasional mitoses are noted, but no atypical mitoses or  cellular pleomorphism.  The tumor is surrounded by eosinophilic,  hyalinized, collagenous stroma with focal chronic inflammation.  These  findings are consistent with a nodular hidradenoma.  This lesion closely  approaches the biopsy base but appears excised in the levels examined.  This case has been reviewed with Dr. Jack Baldwin who agrees with the  diagnosis.    CPT Codes:  A: 24347-BX4.T, 90886-KR9.P    TESTING LAB LOCATION:  [facility], [address]   [address] [phone]    COLLECTION SITE:  Client: [facility]  Loc ation: [facility] (OTILIA)

## 2017-06-05 NOTE — NURSING NOTE
Excision Intake  /79  Pulse 68  LMP 06/30/2003  SpO2 98%    artificial heart valve: No    artificial joint within the last 3 years: No    heart stent in the last 3 months: No    pacemaker: No    defibrillator: No    nerve/brain stimulator: No    bleeding disorder: No    coumadin use: No    heart valve disease: No    site location lower limb or groin: No    hepatitis B/C or HIV: No    smoker: No    Iodine allergy: No     Francine Davila LPN

## 2017-06-05 NOTE — MR AVS SNAPSHOT
After Visit Summary   6/5/2017    Addis Ferreira    MRN: [id number]           Patient Information     Date Of Birth          1955        Visit Information        Provider Department      6/5/2017 8:30 AM Jazzy Boucher MD [facility]        Care Instructions    Excision Wound Care Instructions with Steri strips      I will experience scar, altered skin color, bleeding, swelling, pain, crusting and redness. I may experience altered sensation. Risks are excessive bleeding, infection, muscle weakness, thick (hypertrophic or keloidal) scar, and recurrence. A second procedure may be recommended to obtain the best cosmetic or functional result.    Possible complications of any surgical procedure are bleeding, infection, scarring, alteration in skin color and sensation, muscle weakness in the area, wound dehiscence or seperation, or recurrence of the lesion or disease. On occasion, after healing, a secondary procedure or revision may be recommended in order to obtain the best cosmetic or functional result.       After your surgery, a pressure bandage will be placed over the area that has sutures. This will help prevent bleeding. Please, follow these instructions as they will help you to prevent complications as your wound heals.        For the First 48 hours After Surgery:    1. Leave the pressure bandage on and keep it dry. If it should come loose, you may retape it, but do not take it off.    2. Relax and take it easy. Do not do any vigorous exercise, heavy lifting, or bending forward. This could cause the wound to bleed.    3. Post-operative pain is usually mild. You may take plain or extra strength Tylenol every 4 hours as needed (do not take more than 4,000mg in one day) if you have no liver problems and your doctor says it is okay. Do not take any medicine that contains aspirin, ibuprofen or motrin unless you have been recommended these by a doctor.  Avoid alcohol and  vitamin E as these may increase your tendency to bleed.    4. You may put an ice pack around the bandaged area for 20 minutes every 2-3 hours. This may help reduce swelling, bruising, and pain. Make sure the ice pack is waterproof so that the pressure bandage does not get wet.     5. You may see a small amount of drainage or blood on your pressure bandage. This is normal. However, if drainage or bleeding continues or saturates the bandage, you will need to apply firm pressure over the bandage with a washcloth for 15 minutes. If bleeding continues after applying pressure for 15 minutes then go to the nearest emergency room.        48 Hours After Surgery:    Carefully remove the outer pressure bandage but leave the steri strips in place for these will continue to give the wound edges extra support as they are healing. You may trim the edges of the steri strip that have curled up but do not remove steri strips until they fall off. You may also now shower but do not saturate the wound or steri strips for this will cause your steri strips to fall off. You should cover the steri strips and wound with a wash cloth while showering to protect the area from water. It is ok if the steri strips become a bit damp but do not saturate.     Daily Wound Care:    1. Once the steri strips fall off wash wound with a mild soap and water.  Use caution when washing the wound, be gentle and do not let the forceful shower stream hit the wound directly. DO NOT WASH WITH HYDROGEN PEROXIDE FOR THIS MIGHT CAUSE THE SUTURES TO DISSOLVE FASTER THAN WHAT WE WANT.     2. PAT DRY.    3. Once steri strips fall off apply Vaseline (from a new container or tube) over the suture line with a Q-tip until it is completely healed. It is very important to keep the wound continuously moist, as wounds heal best in a moist environment.    4. Keep the site covered until site is healed, you can cover it with a Telfa (non-stick) dressing and tape or a band-aid.  While your steri strips are on you can use them as your bandage.    5. Once steri strips fall off if you are unable to keep wound covered, you must apply Vaseline every 2 - 3 hours (while awake) to ensure it is being kept moist for optimal healing until completely healed. A dressing overnight is recommended to keep the area moist.        Call Us If:    1. You have pain that is not controlled with Tylenol.    2. You have signs or symptoms of an infection, such as: fever over 100 degrees F, redness, warmth, or foul-smelling or yellow/creamy drainage from the wound.        Who should I call with questions?  [facility]: [phone]   [facility]: [phone]  For urgent needs outside of business hours call the [facility] at [phone] and ask for the dermatology resident on call            Follow-ups after your visit        Who to contact     If you have questions or need follow up information about today's clinic visit or your schedule please contact [facility] directly at [phone].  Normal or non-critical lab and imaging results will be communicated to you by [employer], letter or phone within 4 business days after the clinic has received the results. If you do not hear from us within 7 days, please contact the clinic through [employer] or phone. If you have a critical or abnormal lab result, we will notify you by phone as soon as possible.  Submit refill requests through [employer] or call your pharmacy and they will forward the refill request to us. Please allow 3 business days for your refill to be completed.          Additional Information About Your Visit        [employer] Information     [employer] is an electronic gateway that provides easy, online access to your medical records. With [employer], you can request a clinic appointment, read your test results, renew a prescription or communicate with your care team.     To sign up for  [employer] visit the website at [email or web address]   You will be asked to enter the access code listed below, as well as some personal information. Please follow the directions to create your username and password.     Your access code is: [email or web address]  Expires: 2017  9:45 AM     Your access code will  in 90 days. If you need help or a new code, please contact your [facility] or call [phone] for assistance.        Care EveryWhere ID     This is your Care EveryWhere ID. This could be used by other organizations to access your [facility] medical records  [id number]        Your Vitals Were     Pulse Last Period Pulse Oximetry             68 2003 98%          Blood Pressure from Last 3 Encounters:   17 169/79   17 132/72   16 135/80    Weight from Last 3 Encounters:   17 100.3 kg (221 lb 3.2 oz)   16 99.9 kg (220 lb 4.8 oz)   08/31/15 103 kg (227 lb)              Today, you had the following     No orders found for display       Primary Care Provider Office Phone # Fax #    Omar Hand [phone]       [facility] [address] [place] [address]        Thank you!     Thank you for choosing [facility]  for your care. Our goal is always to provide you with excellent care. Hearing back from our patients is one way we can continue to improve our services. Please take a few minutes to complete the written survey that you may receive in the mail after your visit with us. Thank you!             Your Updated Medication List - Protect others around you: Learn how to safely use, store and throw away your medicines at www.disposemymeds.org.          This list is accurate as of: 17  9:59 AM.  Always use your most recent med list.                   Brand Name Dispense Instructions for use    hydrochlorothiazide 25 MG tablet    HYDRODIURIL    90 tablet    Take 1 tablet (25 mg) by  mouth daily       lisinopril 30 MG tablet    PRINIVIL,ZESTRIL    90 tablet    Take 1 tablet (30 mg) by mouth daily       MULTI-DAY Tabs          norethindrone-ethinyl estradiol 1-5 MG-MCG per tablet    FEMHRT 1/5    3 Package    Take 1 tablet by mouth daily.       PREMARIN cream   Generic drug:  conjugated estrogens     42.5 g    Place  vaginally three times a week.

## 2017-06-05 NOTE — NURSING NOTE
Steri strips and pressure dressing applied to excision site on left neck.  Wound care instructions reviewed with patient and AVS provided.  Patient verbalized understanding. No further questions or concerns at this time.    Francine Davila LPN

## 2017-06-06 ENCOUNTER — TELEPHONE (OUTPATIENT)
Dept: FAMILY MEDICINE | Facility: OTHER | Age: 62
End: 2017-06-06

## 2017-06-06 NOTE — LETTER
[facility]  [address] [facility] [address]  Phone: [phone]  June 26, 2017      Addis Ferreira  [address] [address] [place] [id number]      Dear Addis,    We care about your health and have reviewed your health plan including your medical conditions, medications, and lab results.  Based on this review, it is recommended that you follow up regarding the following health topic(s):  -Breast Cancer Screening  -Colon Cancer Screening    We recommend you take the following action(s):  -schedule a MAMMOGRAM which is due. Please disregard this reminder if you have had this exam elsewhere within the last 1-2 years please let us know so we can update your records.  -schedule a COLONOSCOPY to look for colon cancer (due every 10 years or 5 years in higher risk situations.)  Colonoscopies can prevent 90-95% of colon cancer deaths.  Problem lesions can be removed before they ever become cancer.  If you do not wish to do a colonoscopy or cannot afford to do one at this time, there is another option called a Fecal Immunochemical Occult Blood Test (FIT) a take home stool sample kit.  It does not replace the colonoscopy for colorectal cancer screening, but it can detect hidden bleeding in the lower colon.  It does need to be repeated every year and if a positive result is obtained, you would be referred for a colonoscopy.  If you have completed either one of these tests at another facility, please have the records sent to our clinic for our records.     Please call us at the [facility] - [phone] (or use [employer]) to address the above recommendations.     Thank you for trusting [facility] and we appreciate the opportunity to serve you.  We look forward to supporting your healthcare needs in the future.    Healthy Regards,    Your Health Care Team  [facility]

## 2017-06-06 NOTE — TELEPHONE ENCOUNTER
Summary:    Patient is due/failing the following:   FIT and MAMMOGRAM    Action needed:   Complete a FIT test and schedule a mammogram     Type of outreach:    Phone, left message for patient to call back.     Questions for provider review:    None                                                                                                                                    Chica King       Chart routed to Care Team .        Panel Management Review      Patient has the following on her problem list:     Hypertension   Last three blood pressure readings:  BP Readings from Last 3 Encounters:   06/05/17 169/79   03/23/17 132/72   01/14/16 135/80     Blood pressure: FAILED    HTN Guidelines:  Age 18-59 BP range:  Less than 140/90  Age 60-85 with Diabetes:  Less than 140/90  Age 60-85 without Diabetes:  less than 150/90      Composite cancer screening  Chart review shows that this patient is due/due soon for the following Mammogram and Fecal Colorectal (FIT)

## 2017-06-09 LAB — COPATH REPORT: NORMAL

## 2017-06-29 NOTE — PROGRESS NOTES
DERMATOLOGY EXCISION PROCEDURE NOTE    Dermatology Problem List:  1. 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 Excision was indicated. We discussed the principles of treatment and most likely complications including scarring, bleeding, infection, incomplete excision, wound dehiscence, pain, nerve damage, and recurrence. Informed consent was obtained and the patient underwent the procedure as follows:    PROCEDURE: The patient was taken to the operative suite. Time-out was performed.  The treatment area was anesthetized with 1% lidocaine with epinephrine. The area was prepped with Chlorhexidine and rinsed with sterile saline and draped with sterile towels. The lesion was delineated and excised down to subcutaneous fat in a fusiform manner. Hemostasis was obtained by electrocoagulation.     REPAIR: An intermediate layered linear closure was selected as the procedure which would maximally preserve both function and cosmesis.    After the excision of the tumor, the area was carefully undermined. Hemostasis was obtained with spot electrocoagulation.  Closure was oriented so that the wound was in the patient's natural skin tension lines. The subcutaneous and dermal layers were then closed with 4-0 vicryl sutures. The epidermis was then carefully approximated along the length of the wound using 4-0 monocryl running subcuticular sutures.     The final wound length was 4.8 cm. A total of 6 ml of anesthesia was administered for all surgical sites. Estimated blood loss was less  LM for Pt, notified he should be evaluated by a provider if symptoms are persisting. Pt to return phone call to discuss.    than 10 ml for all surgical sites. A sterile pressure dressing was applied and wound care instructions, with a written handout, were given. The [facility] and ambulatory.    Dr. Boucher was immediately available for the entire surgery and was physicially present for the key portions of the procedure.    Anatomic Pathology Results: pending    Clinical Follow-Up: pending pathology result    Staff Involved:  Staff Only    Jazzy Boucher MD    Department of Dermatology  [facility]

## 2017-08-10 ENCOUNTER — TELEPHONE (OUTPATIENT)
Dept: FAMILY MEDICINE | Facility: OTHER | Age: 62
End: 2017-08-10

## 2017-08-22 ENCOUNTER — TRANSFERRED RECORDS (OUTPATIENT)
Dept: HEALTH INFORMATION MANAGEMENT | Facility: CLINIC | Age: 62
End: 2017-08-22

## 2017-11-28 ENCOUNTER — TRANSFERRED RECORDS (OUTPATIENT)
Dept: HEALTH INFORMATION MANAGEMENT | Facility: CLINIC | Age: 62
End: 2017-11-28

## 2018-03-15 ENCOUNTER — TELEPHONE (OUTPATIENT)
Dept: FAMILY MEDICINE | Facility: OTHER | Age: 63
End: 2018-03-15

## 2018-03-15 DIAGNOSIS — I10 HYPERTENSION GOAL BP (BLOOD PRESSURE) < 140/90: ICD-10-CM

## 2018-03-15 RX ORDER — HYDROCHLOROTHIAZIDE 25 MG/1
25 TABLET ORAL DAILY
Qty: 30 TABLET | Refills: 0 | Status: SHIPPED | OUTPATIENT
Start: 2018-03-15 | End: 2018-04-17

## 2018-03-15 RX ORDER — LISINOPRIL 30 MG/1
30 TABLET ORAL DAILY
Qty: 30 TABLET | Refills: 0 | Status: SHIPPED | OUTPATIENT
Start: 2018-03-15 | End: 2018-04-17

## 2018-03-15 NOTE — TELEPHONE ENCOUNTER
Pending Prescriptions:                       Disp   Refills    lisinopril (PRINIVIL,ZESTRIL) 30 MG [id number] tab*3            Sig: Take 1 tablet (30 mg) by mouth daily    hydrochlorothiazide (HYDRODIURIL) 25 MG t*90 tab*3            Sig: Take 1 tablet (25 mg) by mouth daily    Medication is being filled for 1 time refill only due to:  Patient needs to be seen because it has been more than one year since last visit.     Please call and help schedule.  Hollis Jean, RN, BSN

## 2018-03-15 NOTE — TELEPHONE ENCOUNTER
Reason for Call:  Medication or medication refill:    Do you use a [facility]?  Name of the pharmacy and phone number for the current request:  [facility] in [place]    Name of the medication requested: lisinopril (PRINIVIL,ZESTRIL) 30 MG tablet and hydrochlorothiazide (HYDRODIURIL) 25 MG tablet    Other request: patient is out of town and is running out of medication.  The pharmacy phone is [phone]    Can we leave a detailed message on this number? YES    Phone number patient can be reached at: Cell number on file:    Telephone Information:   Mobile [phone]       Best Time: any    Call taken on 3/15/2018 at 8:48 AM by Rand Figueroa

## 2018-03-16 NOTE — TELEPHONE ENCOUNTER
Pt informed and said that it's on her list to get into clinic for a follow up with her provider.  Kwasi Jordan, CMA

## 2018-04-17 ENCOUNTER — TELEPHONE (OUTPATIENT)
Dept: FAMILY MEDICINE | Facility: OTHER | Age: 63
End: 2018-04-17

## 2018-04-17 DIAGNOSIS — I10 HYPERTENSION GOAL BP (BLOOD PRESSURE) < 140/90: ICD-10-CM

## 2018-04-17 RX ORDER — HYDROCHLOROTHIAZIDE 25 MG/1
25 TABLET ORAL DAILY
Qty: 15 TABLET | Refills: 0 | Status: SHIPPED | OUTPATIENT
Start: 2018-04-17 | End: 2018-04-26

## 2018-04-17 RX ORDER — LISINOPRIL 30 MG/1
30 TABLET ORAL DAILY
Qty: 15 TABLET | Refills: 0 | Status: SHIPPED | OUTPATIENT
Start: 2018-04-17 | End: 2018-04-26

## 2018-04-17 NOTE — TELEPHONE ENCOUNTER
Reason for call:  Medication  Reason for Call:  Medication or medication refill:    Do you use a [facility]?  Name of the pharmacy and phone number for the current request:  [facility] in [place] [phone]    Name of the medication requested: lisnopril and hydrochlorothiazaide     Other request: pt will not have enough to get her to her appointment 4/26. She will be short about 3 pills, can she have some called into the pharmacy?    Can we leave a detailed message on this number? YES    Phone number patient can be reached at: Cell number on file:    Telephone Information:   Mobile        Best Time: asap    Call taken on 4/17/2018 at 3:30 PM by Sharlene Sosa

## 2018-04-20 NOTE — PROGRESS NOTES
SUBJECTIVE:   Addis Ferreira is a 63 year old female who presents to clinic today for the following health issues:      History of Present Illness     Hypertension:     Outpatient blood pressures:  Are not being checked    Dietary sodium intake::  Not monitoring salt intake    Diet:  Regular (no restrictions)  Frequency of exercise:  None  Taking medications regularly:  Yes  Additional concerns today:  No    Hasn't taken HCTZ for 3 days because she ran out of pills. She tried to get refills, this was sent in on the 17th, but her pharmacy did not say they had them. No concerns of being low. Some slight cough with the lisinopril, but she drinks more water and feels fine.     Working on her diet - making more mindful decisions about eating. Will be getting more exercise now that it's getting nice out.     Colon cancer screen: Patient feels very stressed out by colonoscopy, had been doing FIT tests but has been a few years. She denies concerns (changes in bowels, blood in stool, family history). She is amenable to a FIT test today.     Mammogram - 2002 was her last one, her gynecologist offers them but she has not made any appointments. Denies breast concerns. Still doing HRT with obgyn, so we explained that a mammogram is quite important.     Problem list and histories reviewed & adjusted, as indicated.  Additional history: as documented    Patient Active Problem List   Diagnosis     Irregular menstrual cycle     Pityriasis rosea     Hypertension goal BP (blood pressure) < 140/90     CARDIOVASCULAR SCREENING; LDL GOAL LESS THAN 160     Advanced directives, counseling/discussion     Clear cell hidradenoma     Past Surgical History:   Procedure Laterality Date     HC DILATION/CURETTAGE DIAG/THER NON OB  1998    after miscarriage       Social History   Substance Use Topics     Smoking status: Former Smoker     Packs/day: 1.00     Years: 30.00     Quit date: 1/18/2001     Smokeless tobacco: Never Used     Alcohol use Yes  "     Comment: 0-3 drinks wkly     Family History   Problem Relation Age of Onset     Hypertension Father      CEREBROVASCULAR DISEASE Paternal Grandmother      CEREBROVASCULAR DISEASE Paternal Grandfather          Current Outpatient Prescriptions   Medication Sig Dispense Refill     hydrochlorothiazide (HYDRODIURIL) 25 MG tablet Take 1 tablet (25 mg) by mouth daily 15 tablet 0     lisinopril (PRINIVIL,ZESTRIL) 30 MG tablet Take 1 tablet (30 mg) by mouth daily 15 tablet 0     conjugated estrogens (PREMARIN) vaginal cream Place  vaginally three times a week. 42.5 g 12     MULTI-DAY OR TABS   0     norethindrone-ethinyl estradiol (FEMHRT 1/5) 1-5 MG-MCG per tablet Take 1 tablet by mouth daily. 3 Package 3     Recent Labs   Lab Test  03/23/17   0946  01/14/16   0831  01/15/15   1028  01/15/15   1013  01/02/14   0917   LDL  135*  119*  120   --   123   HDL  37*  40*  42*   --   40*   TRIG  160*  220*  207*   --   186*   ALT   --    --    --   25  27   CR  0.76  0.85   --   0.85  0.84   GFRESTIMATED  78  69   --   69  70   GFRESTBLACK  >90   GFR Calc    83   --   83  85   POTASSIUM  4.0  4.0   --   4.0  4.4      BP Readings from Last 3 Encounters:   04/26/18 150/74   06/05/17 169/79   03/23/17 132/72    Wt Readings from Last 3 Encounters:   04/26/18 221 lb 12.8 oz (100.6 kg)   03/23/17 221 lb 3.2 oz (100.3 kg)   01/14/16 220 lb 4.8 oz (99.9 kg)                 ROS:  CONSTITUTIONAL: NEGATIVE for fever, chills, change in weight  SKIN: negative - had lesion removed by derm last summer, no concerns   ENT/MOUTH: NEGATIVE for ear, mouth and throat problems  RESP: NEGATIVE for significant cough or SOB  CV: NEGATIVE for NEFF, chest pain, palpitations or peripheral edema    OBJECTIVE:     /74 (BP Location: Left arm, Patient Position: Chair, Cuff Size: Adult Large)  Pulse 64  Temp 98.6  F (37  C) (Temporal)  Resp 16  Ht 5' 2.5\" (1.588 m)  Wt 221 lb 12.8 oz (100.6 kg)  LMP 06/30/2003  BMI 39.92 " "kg/m2  Body mass index is 39.92 kg/(m^2).  GENERAL: healthy, alert and no distress  NECK: no adenopathy, no asymmetry, masses, or scars and thyroid normal to palpation  RESP: lungs clear to auscultation - no rales, rhonchi or wheezes  CV: regular rates and rhythm, normal S1 S2, no S3 or S4, grade 3/6 early systolic murmur heard best over the RUSB, peripheral pulses strong and no peripheral edema  ABDOMEN: soft, nontender, no hepatosplenomegaly, no masses and bowel sounds normal  MS: no gross musculoskeletal defects noted, no edema    Diagnostic Test Results:  Results for orders placed or performed in visit on 06/05/17   Surgical pathology exam   Result Value Ref Range    Copath Report       Patient Name: BHAVANA HUI  MR#: [id number]  Specimen #: [id number]  Collected: 6/5/2017  Received: 6/6/2017  Reported: 6/9/2017 19:01  Ordering Phy(s): KAMILAH DUONG    For improved result formatting, select 'View Enhanced Report Format'  under Linked Documents section.    SPECIMEN(S):  Skin, right neck    FINAL DIAGNOSIS:  Skin, neck, right:  - Nodular hidradenoma - (see description)    I have personally reviewed all specimens and or slides, including the  listed special stains, and used them with my medical judgement to  determine the final diagnosis.    Electronically signed out by:    Chalo Suarez M.D., [facility]    CLINICAL HISTORY:  The patient is a 62-year-old female.    GROSS:  The specimen is received in formalin with proper patient identification,  labeled \"R. neck\", and consists of a 3.6 x 1.9 cm pale tan skin ellipse,  excised to a greatest depth of 0.8 cm.  No orientation is provided.  At  the pericentral skin surface is a 0.9 x 0.7 cm tan, centrally red,   nodule-like lesion which is raised from the skin surface up to 0.7 cm.  Within the subcutaneous tissue deep to the surface lesion is a 1.1 x 0.8  x 0.8 cm well-circumscribed, rubbery lesion with two central cavitary  areas, one of which contains a " "yellow-tinged, thin fluid and the other  of which contains a blood-tinged, thin fluid.  This rubbery lesion  focally abuts the margin of resection.  Within the tip of the skin  surface lesion is a 0.6 cm smooth-lined cavity which contains a  blood-tinged, thin fluid.  The margins are inked blue, and the specimen  is serially sectioned and entirely submitted as follows:    Summary of Sections:  1 - skin tips  2-6 - central aspect of skin, submitted in sequence (Dictated by: Mounika Gastelum 6/6/2017 01:41 PM)    MICROSCOPIC:  In the dermis and subcutis is a neoplasm which has a nodular growth  pattern.  This neoplasm appears relatively circumscribed.  The neoplasm  consists predominantly of squamoid cells having eosinophilic cytoplasm,  regular , oval nuclei, and well defined cell borders.  Also present are  areas of cells having clear cytoplasm.  Ducts and cysts are present  within the neoplasm.  Many of the cells lining these ducts are columnar  and show apocrine secretion.  Focally, cells having mucinous cytoplasm  are present.  Occasional mitoses are noted, but no atypical mitoses or  cellular pleomorphism.  The tumor is surrounded by eosinophilic,  hyalinized, collagenous stroma with focal chronic inflammation.  These  findings are consistent with a nodular hidradenoma.  This lesion closely  approaches the biopsy base but appears excised in the levels examined.  This case has been reviewed with Dr. Jack Baldwin who agrees with the  diagnosis.    CPT Codes:  A: 40522-WX1.T, 94933-FM3.P    TESTING LAB LOCATION:  [facility], [address]   [id number] [phone]    COLLECTION SITE:  Client: [facility]  Loc ation: [facility] (B)         ASSESSMENT/PLAN:     BMI:   Estimated body mass index is 39.92 kg/(m^2) as calculated from the following:    Height as of this encounter: 5' 2.5\" (1.588 m).    Weight as " of this encounter: 221 lb 12.8 oz (100.6 kg).   Weight management plan: Discussed healthy diet and exercise guidelines and patient will follow up in 12 months in clinic to re-evaluate.        ICD-10-CM    1. Hypertension goal BP (blood pressure) < 140/90 I10 hydrochlorothiazide (HYDRODIURIL) 25 MG tablet     lisinopril (PRINIVIL,ZESTRIL) 30 MG tablet     Basic metabolic panel   2. Undiagnosed cardiac murmurs R01.1 Echocardiogram Complete   3. Special screening for malignant neoplasms, colon Z12.11 Fecal colorectal cancer screen (FIT)   4. Encounter for screening mammogram for breast cancer Z12.31 *MA Screening Digital Bilateral     1. Patient has missed 3 doses of her HCTZ due to running out of pills while out of town. She was high today, at 150/82. Instructed patient to resume her current medications, and return for blood pressure check in 1-4 weeks. If necessary, would increase lisinopril at that point. Will get BMP today.   2. Grade 3/6 early systolic murmur heard best at RUSB. This is the first time noting this murmur, per patient and chart. Patient is asymptomatic, denies NEFF, edema, or shortness of breath, which is reassuring. However, since this is the first time hearing this murmur, will get echocardiogram to determine etiology.   3. Patient has not had FIT test in several years, per her. She declines colonoscopy, but will get FIT test today. Denies symptoms.   4. Encouraged patient to get a mammogram as she is on HRT and has not had a mammogram since 2002. She denies any breast concerns. Patient scheduled for mammogram.               Patient Instructions   Thank you for visiting [facility]    Your blood pressure was high today.  Please come back in 1-4 weeks for a nurse visit blood pressure check.     Please get your FIT kit, your mammogram, and your echo done.    We'll let you know your results as soon as we can.     Please Follow Up when indicated on the section below this one on your  After-Visit Summary.    See me in 6-12 months, sooner if issues.      If you had imaging scheduled please refer to your radiology prep sheet.    Appointment    Date_______________     Time_____________    Day:   M TU W TH F    With____________________________    Location_________________________    If you need medication refills, please contact your pharmacy 3 days before your prescriptions runs out. If you are out of refills, your pharmacy will contact contact the clinic.    Contact us or return if questions or concerns.     -Your Care Team:  MD Josey Leonard PA-C Joel De Haan, PA-C Elizabeth McLean, APRN CNP    General information about your clinic      Clinic hours:     Lab hours:  Phone [phone]  Monday 7:30 am-7 pm    Monday 8:30 am-6:30 pm  Tuesday-Friday 7:30 am-5 pm   Tuesday-Friday 8:30 am-4:30 pm    Pharmacy hours:  Phone [phone]  Monday 8:30 am-7pm  Tuesday-Friday 8:30am-6 pm                                       Mychart assistance [phone]        We would like to hear from you, how was your visit today?    Radha Stewart  Patient Information Supervisor   Patient Care Supervisor  [facility], and [facility] [place], and [facility]  [phone]       This document serves as a record of the services and decisions personally performed and made by Omar Hand MD.  It was created on his/her behalf by Breanne Galvan, a trained medical student and scribe.  The creation of this record is based on the provider's personal observations and the statements of the patient.     Breanne Galvan, MS3  April 26, 2018    This patient was seen and examined by myself as well as the medical student.  The medical student has scribed the note and I have reviewed it, edited it appropriately and agree with the final documentation. Electronically signed by MD Omar Leonard MD,  MD  [facility]

## 2018-04-26 ENCOUNTER — TELEPHONE (OUTPATIENT)
Dept: FAMILY MEDICINE | Facility: OTHER | Age: 63
End: 2018-04-26

## 2018-04-26 ENCOUNTER — OFFICE VISIT (OUTPATIENT)
Dept: FAMILY MEDICINE | Facility: OTHER | Age: 63
End: 2018-04-26
Payer: COMMERCIAL

## 2018-04-26 ENCOUNTER — HOSPITAL ENCOUNTER (OUTPATIENT)
Dept: CARDIOLOGY | Facility: CLINIC | Age: 63
Discharge: HOME | End: 2018-04-26
Attending: FAMILY MEDICINE | Admitting: FAMILY MEDICINE
Payer: COMMERCIAL

## 2018-04-26 VITALS
SYSTOLIC BLOOD PRESSURE: 150 MMHG | HEIGHT: 63 IN | DIASTOLIC BLOOD PRESSURE: 82 MMHG | RESPIRATION RATE: 16 BRPM | WEIGHT: 221.8 LBS | BODY MASS INDEX: 39.3 KG/M2 | TEMPERATURE: 98.6 F | HEART RATE: 64 BPM

## 2018-04-26 DIAGNOSIS — Z12.11 SPECIAL SCREENING FOR MALIGNANT NEOPLASMS, COLON: ICD-10-CM

## 2018-04-26 DIAGNOSIS — Z12.31 ENCOUNTER FOR SCREENING MAMMOGRAM FOR BREAST CANCER: ICD-10-CM

## 2018-04-26 DIAGNOSIS — I10 HYPERTENSION GOAL BP (BLOOD PRESSURE) < 140/90: Primary | ICD-10-CM

## 2018-04-26 DIAGNOSIS — R01.1 UNDIAGNOSED CARDIAC MURMURS: ICD-10-CM

## 2018-04-26 LAB
ANION GAP SERPL CALCULATED.3IONS-SCNC: 7 MMOL/L (ref 3–14)
BUN SERPL-MCNC: 13 MG/DL (ref 7–30)
CALCIUM SERPL-MCNC: 9.4 MG/DL (ref 8.5–10.1)
CHLORIDE SERPL-SCNC: 105 MMOL/L (ref 94–109)
CO2 SERPL-SCNC: 26 MMOL/L (ref 20–32)
CREAT SERPL-MCNC: 0.86 MG/DL (ref 0.52–1.04)
GFR SERPL CREATININE-BSD FRML MDRD: 67 ML/MIN/1.7M2
GLUCOSE SERPL-MCNC: 94 MG/DL (ref 70–99)
POTASSIUM SERPL-SCNC: 3.9 MMOL/L (ref 3.4–5.3)
SODIUM SERPL-SCNC: 138 MMOL/L (ref 133–144)

## 2018-04-26 PROCEDURE — 36415 COLL VENOUS BLD VENIPUNCTURE: CPT | Performed by: FAMILY MEDICINE

## 2018-04-26 PROCEDURE — 80048 BASIC METABOLIC PNL TOTAL CA: CPT | Performed by: FAMILY MEDICINE

## 2018-04-26 PROCEDURE — 93306 TTE W/DOPPLER COMPLETE: CPT | Mod: 26 | Performed by: INTERNAL MEDICINE

## 2018-04-26 PROCEDURE — 99214 OFFICE O/P EST MOD 30 MIN: CPT | Performed by: FAMILY MEDICINE

## 2018-04-26 PROCEDURE — 93306 TTE W/DOPPLER COMPLETE: CPT

## 2018-04-26 RX ORDER — HYDROCHLOROTHIAZIDE 25 MG/1
25 TABLET ORAL DAILY
Qty: 90 TABLET | Refills: 3 | Status: SHIPPED | OUTPATIENT
Start: 2018-04-26 | End: 2019-01-17

## 2018-04-26 RX ORDER — LISINOPRIL 30 MG/1
30 TABLET ORAL DAILY
Qty: 90 TABLET | Refills: 3 | Status: SHIPPED | OUTPATIENT
Start: 2018-04-26 | End: 2019-01-17

## 2018-04-26 ASSESSMENT — PAIN SCALES - GENERAL: PAINLEVEL: NO PAIN (0)

## 2018-04-26 NOTE — MR AVS SNAPSHOT
After Visit Summary   4/26/2018    Addis Ferreira    MRN: [id number]           Patient Information     Date Of Birth          1955        Visit Information        Provider Department      4/26/2018 9:00 AM Omar Hand MD [facility]        Today's Diagnoses     Hypertension goal BP (blood pressure) < 140/90    -  1    Undiagnosed cardiac murmurs        Special screening for malignant neoplasms, colon        Encounter for screening mammogram for breast cancer          Care Instructions    Thank you for visiting [facility]    Your blood pressure was high today.  Please come back in 1-4 weeks for a nurse visit blood pressure check.     Please get your FIT kit, your mammogram, and your echo done.    We'll let you know your results as soon as we can.     Please Follow Up when indicated on the section below this one on your After-Visit Summary.    See me in 6-12 months, sooner if issues.      If you had imaging scheduled please refer to your radiology prep sheet.    Appointment    Date_______________     Time_____________    Day:   M TU W TH F    With____________________________    Location_________________________    If you need medication refills, please contact your pharmacy 3 days before your prescriptions runs out. If you are out of refills, your pharmacy will contact contact the clinic.    Contact us or return if questions or concerns.     -Your Care Team:  MD Josey Leonard PA-C Joel De Haan, PA-C Elizabeth McLean, VINICIUS TSE    General information about your clinic      Clinic hours:     Lab hours:  Phone [phone]  Monday 7:30 am-7 pm    Monday 8:30 am-6:30 pm  Tuesday-Friday 7:30 am-5 pm   Tuesday-Friday 8:30 am-4:30 pm    Pharmacy hours:  Phone [phone]  Monday 8:30 am-7pm  Tuesday-Friday 8:30am-6 pm                                       Mychart assistance [phone]        We would like to hear from you, how was  "your visit today?    Radha Stewart  Patient Information Supervisor   Patient Care Supervisor  [facility], and [facility], and [facility]  [phone]             Follow-ups after your visit        Follow-up notes from your care team     Return in about 4 weeks (around 5/24/2018) for nurse visit BP Recheck.      Your next 10 appointments already scheduled     Apr 26, 2018  1:00 PM CDT   Ech Complete with PHECHR1   [facility] ([facility])    [address] [place] [address]  [place] [address] [phone]           1. Please bring or wear a comfortable two-piece outfit. 2. You may eat, drink and take your normal medicines. 3. For any questions that cannot be answered, please contact the ordering physician            May 03, 2018  8:00 AM CDT   (Arrive by 7:45 AM)   MA SCREENING DIGITAL BILATERAL with PHMA1   [facility] ([facility])    [address] [place] [address] [place] [address] [phone]           Do not use any powder, lotion or deodorant under your arms or on your breast. If you do, we will ask you to remove it before your exam.  Wear comfortable, two-piece clothing.  If you have any allergies, tell your care team.  Bring any previous mammograms from other facilities or have them mailed to the breast center. Three-dimensional (3D) mammograms are available at [place] locations in [facility], [facility], [place], [place], and [place]. [facility] locations include [place] [facility] in [place]. Benefits of 3D mammograms include: - Improved rate of cancer detection - Decreases your chance of having to go back for more tests, which means fewer: - \"False-positive\" results (This means that there is an abnormal area but it isn't cancer.) - Invasive testing procedures, such as a biopsy or surgery - Can provide " "clearer images of the breast if you have dense breast tissue. 3D mammography is an optional exam that anyone can have with a 2D mammogram. It doesn't replace or take the place of a 2D mammogram. 2D mammograms remain an effective screening test for all women.  Not all insurance companies cover the cost of a 3D mammogram. Check with your insurance.              Future tests that were ordered for you today     Open Future Orders        Priority Expected Expires Ordered    Fecal colorectal cancer screen (FIT) Routine 5/17/2018 7/19/2018 4/26/2018    Echocardiogram Complete Routine  4/26/2019 4/26/2018    *MA Screening Digital Bilateral Routine  4/26/2019 4/26/2018            Who to contact     If you have questions or need follow up information about today's clinic visit or your schedule please contact [facility] directly at [phone].  Normal or non-critical lab and imaging results will be communicated to you by Maiyethart, letter or phone within 4 business days after the clinic has received the results. If you do not hear from us within 7 days, please contact the clinic through Maiyethart or phone. If you have a critical or abnormal lab result, we will notify you by phone as soon as possible.  Submit refill requests through Plannify or call your pharmacy and they will forward the refill request to us. Please allow 3 business days for your refill to be completed.          Additional Information About Your Visit        Plannify Information     Plannify lets you send messages to your doctor, view your test results, renew your prescriptions, schedule appointments and more. To sign up, go to [email or web address] . Click on \"Log in\" on the left side of the screen, which will take you to the Welcome page. Then click on \"Sign up Now\" on the right side of the page.     You will be asked to enter the access code listed below, as well as some personal information. Please follow the directions to create your " "username and password.     Your access code is: [email or web address]  Expires: 2018  9:51 AM     Your access code will  in 90 days. If you need help or a new code, please call your [facility] clinic or [phone].        Care EveryWhere ID     This is your Care EveryWhere ID. This could be used by other organizations to access your [facility] medical records  [id number]        Your Vitals Were     Pulse Temperature Respirations Height Last Period BMI (Body Mass Index)    64 98.6  F (37  C) (Temporal) 16 5' 2.5\" (1.588 m) 2003 39.92 kg/m2       Blood Pressure from Last 3 Encounters:   18 150/74   17 169/79   17 132/72    Weight from Last 3 Encounters:   18 221 lb 12.8 oz (100.6 kg)   17 221 lb 3.2 oz (100.3 kg)   16 220 lb 4.8 oz (99.9 kg)              We Performed the Following     Basic metabolic panel          Where to get your medicines      These medications were sent to [facility] - [address]   [address] [place] [address]     Phone:  [phone]     hydrochlorothiazide 25 MG tablet    lisinopril 30 MG tablet          Primary Care Provider Office Phone # Fax #    Omar Brett Hand [phone] [address] [place] [address]        Equal Access to Services     [facility] AH: Hadii aad ku hadasho Soomaali, waaxda luqadaha, qaybta kaalmada adeegyada, waxay gracie vo. So [facility] [phone].    ATENCIÓN: Si habla español, tiene a tyler disposición servicios gratuitos de asistencia lingüística. Llame al [phone].    We comply with applicable federal civil rights laws and [place] laws. We do not discriminate on the basis of race, color, national origin, age, disability, sex, sexual orientation, or gender identity.            Thank you!     Thank you for choosing [facility]  for your care. Our goal is always to provide you with excellent care. " Hearing back from our patients is one way we can continue to improve our services. Please take a few minutes to complete the written survey that you may receive in the mail after your visit with us. Thank you!             Your Updated Medication List - Protect others around you: Learn how to safely use, store and throw away your medicines at www.disposemymeds.org.          This list is accurate as of 4/26/18  9:59 AM.  Always use your most recent med list.                   Brand Name Dispense Instructions for use Diagnosis    hydrochlorothiazide 25 MG tablet    HYDRODIURIL    90 tablet    Take 1 tablet (25 mg) by mouth daily    Hypertension goal BP (blood pressure) < 140/90       lisinopril 30 MG tablet    PRINIVIL,ZESTRIL    90 tablet    Take 1 tablet (30 mg) by mouth daily    Hypertension goal BP (blood pressure) < 140/90       MULTI-DAY Tabs           norethindrone-ethinyl estradiol 1-5 MG-MCG per tablet    FEMHRT 1/5    3 Package    Take 1 tablet by mouth daily.        PREMARIN cream   Generic drug:  conjugated estrogens     42.5 g    Place  vaginally three times a week.

## 2018-04-26 NOTE — TELEPHONE ENCOUNTER
Left message for patient to return call back to clinic.  Please inform of message below.    Notes Recorded by Omar Hand MD on 4/26/2018 at 2:33 PM  Your echocardiogram was normal. All of your labs were normal for you.    Thanks,    Dr. Yazan Balderas, TONI  April 26, 2018

## 2018-04-26 NOTE — LETTER
April 30, 2018      Addis Sainzstacy  [address] [address] [place] [address]        Dear ,    We are writing to inform you of your test results.    Your test results fall within the expected range(s) or remain unchanged from previous results.        If you have any questions or concerns, please call the clinic at the number listed above.       Sincerely,        Omar Hand MD, jrm, cma

## 2018-04-26 NOTE — PATIENT INSTRUCTIONS
Thank you for visiting [facility]    Your blood pressure was high today.  Please come back in 1-4 weeks for a nurse visit blood pressure check.     Please get your FIT kit, your mammogram, and your echo done.    We'll let you know your results as soon as we can.     Please Follow Up when indicated on the section below this one on your After-Visit Summary.    See me in 6-12 months, sooner if issues.      If you had imaging scheduled please refer to your radiology prep sheet.    Appointment    Date_______________     Time_____________    Day:   M TU W TH F    With____________________________    Location_________________________    If you need medication refills, please contact your pharmacy 3 days before your prescriptions runs out. If you are out of refills, your pharmacy will contact contact the clinic.    Contact us or return if questions or concerns.     -Your Care Team:  MD Josey Leonard PA-C Joel De Haan, PA-C Elizabeth McLean, APRN CNP    General information about your clinic      Clinic hours:     Lab hours:  Phone [phone]  Monday 7:30 am-7 pm    Monday 8:30 am-6:30 pm  Tuesday-Friday 7:30 am-5 pm   Tuesday-Friday 8:30 am-4:30 pm    Pharmacy hours:  Phone [phone]  Monday 8:30 am-7pm  Tuesday-Friday 8:30am-6 pm                                       Mychart assistance [phone]        We would like to hear from you, how was your visit today?    Radha Stewart  Patient Information Supervisor   Patient Care Supervisor  [facility], and \[facility]\"", and [facility]  [phone]

## 2018-04-26 NOTE — NURSING NOTE
"Chief Complaint   Patient presents with     Hypertension     Panel Management     Flu, Mammo, CC screen, HIV Screen, BMP       Initial /74 (BP Location: Left arm, Patient Position: Chair, Cuff Size: Adult Large)  Pulse 64  Temp 98.6  F (37  C) (Temporal)  Resp 16  Ht 5' 2.5\" (1.588 m)  Wt 221 lb 12.8 oz (100.6 kg)  LMP 06/30/2003  BMI 39.92 kg/m2 Estimated body mass index is 39.92 kg/(m^2) as calculated from the following:    Height as of this encounter: 5' 2.5\" (1.588 m).    Weight as of this encounter: 221 lb 12.8 oz (100.6 kg).  Medication Reconciliation: complete  Kwasi Jordan, CMA    "

## 2018-05-03 ENCOUNTER — HOSPITAL ENCOUNTER (OUTPATIENT)
Dept: MAMMOGRAPHY | Facility: CLINIC | Age: 63
Discharge: HOME | End: 2018-05-03
Attending: FAMILY MEDICINE | Admitting: FAMILY MEDICINE
Payer: COMMERCIAL

## 2018-05-03 DIAGNOSIS — Z12.31 VISIT FOR SCREENING MAMMOGRAM: ICD-10-CM

## 2018-05-03 PROCEDURE — 77067 SCR MAMMO BI INCL CAD: CPT

## 2018-06-25 ENCOUNTER — TELEPHONE (OUTPATIENT)
Dept: FAMILY MEDICINE | Facility: OTHER | Age: 63
End: 2018-06-25

## 2018-06-25 NOTE — LETTER
[facility]  [address] [facility] [address]  Phone: [phone]  June 25, 2018      Addis Ferreira  [address] [address] [place] [id number]      Dear Addis,    We care about your health and have reviewed your health plan including your medical conditions, medications, and lab results.  Based on this review, it is recommended that you follow up regarding the following health topic(s):  -High Blood Pressure  -Colon Cancer Screening  -Cervical Cancer Screening    We recommend you take the following action(s):  -schedule a FREE FLOAT MA-ONLY BLOOD PRESSURE APPOINTMENT within the next 1-4 weeks.  -schedule a COLONOSCOPY to look for colon cancer (due every 10 years or 5 years in higher risk situations.)  Colonoscopies can prevent 90-95% of colon cancer deaths.  Problem lesions can be removed before they ever become cancer.  If you do not wish to do a colonoscopy or cannot afford to do one at this time, there is another option called a Fecal Immunochemical Occult Blood Test (FIT) a take home stool sample kit.  It does not replace the colonoscopy for colorectal cancer screening, but it can detect hidden bleeding in the lower colon.  It does need to be repeated every year and if a positive result is obtained, you would be referred for a colonoscopy.  If you have completed either one of these tests at another facility, please have the records sent to our clinic for our records.  -schedule a PAP SMEAR EXAM which is due.  Please disregard this reminder if you have had this exam elsewhere within the last year.  It would be helpful for us to have a copy of your recent pap smear report to update your records.     Please call us at the [facility] - [phone] (or use SOLO) to address the above recommendations.     Thank you for trusting [facility] and we appreciate the opportunity to serve you.  We look forward to supporting your healthcare needs in the  future.    Healthy Regards,    Your Health Care Team  [facility]

## 2018-06-25 NOTE — TELEPHONE ENCOUNTER
Summary:    Patient is due/failing the following:   BP CHECK, FIT and PAP    Action needed:   Patient needs office visit for PAP and BP check . and complete a FIT test     Type of outreach:    phone no answer or voicemail. Reminder letter sent    Questions for provider review:    None                                                                                                                                    Chica King    Chart routed to Care Team .        Panel Management Review      Patient has the following on her problem list:     Hypertension   Last three blood pressure readings:  BP Readings from Last 3 Encounters:   04/26/18 150/82   06/05/17 169/79   03/23/17 132/72     Blood pressure: FAILED    HTN Guidelines:  Age 18-59 BP range:  Less than 140/90  Age 60-85 with Diabetes:  Less than 140/90  Age 60-85 without Diabetes:  less than 150/90      Composite cancer screening  Chart review shows that this patient is due/due soon for the following Pap Smear and Fecal Colorectal (FIT)

## 2018-10-15 ENCOUNTER — TELEPHONE (OUTPATIENT)
Dept: FAMILY MEDICINE | Facility: OTHER | Age: 63
End: 2018-10-15

## 2018-10-15 NOTE — TELEPHONE ENCOUNTER
Summary:    Patient is due/failing the following:   FIT, BP check and PAP    Action needed:   Patient needs office visit for PAP. and complete a FIT test     Type of outreach:    Phone, spoke to patient.  patient completes PAP outside of [facility]. and not interested in the FIT test at this time    Questions for provider review:    None                                                                                                                                    Chica King       Chart routed to Care Team .          Panel Management Review      Patient has the following on her problem list:     Hypertension   Last three blood pressure readings:  BP Readings from Last 3 Encounters:   04/26/18 150/82   06/05/17 169/79   03/23/17 132/72     Blood pressure: FAILED    HTN Guidelines:  Age 18-59 BP range:  Less than 140/90  Age 60-85 with Diabetes:  Less than 140/90  Age 60-85 without Diabetes:  less than 150/90      Composite cancer screening  Chart review shows that this patient is due/due soon for the following Pap Smear and Fecal Colorectal (FIT)

## 2018-11-08 ENCOUNTER — TRANSFERRED RECORDS (OUTPATIENT)
Dept: HEALTH INFORMATION MANAGEMENT | Facility: CLINIC | Age: 63
End: 2018-11-08

## 2019-01-10 NOTE — PATIENT INSTRUCTIONS
Before Your Surgery      Call your surgeon if there is any change in your health. This includes signs of a cold or flu (such as a sore throat, runny nose, cough, rash or fever).    Do not smoke, drink alcohol or take over the counter medicine (unless your surgeon or primary care doctor tells you to) for the 24 hours before and after surgery.    If you take prescribed drugs: Follow your doctor s orders about which medicines to take and which to stop until after surgery.    Eating and drinking prior to surgery: follow the instructions from your surgeon    Take a shower or bath the night before surgery. Use the soap your surgeon gave you to gently clean your skin. If you do not have soap from your surgeon, use your regular soap. Do not shave or scrub the surgery site.  Wear clean pajamas and have clean sheets on your bed.     Increase your lisinopril dose to 40 mg daily to help with blood pressure.    Your blood pressure was high today.  Please come back in 1-4 weeks for a nurse visit blood pressure check.     Skip ibuprofen and aspirin for a week before surgery.  Can use tylenol if needed for pain.    On the morning of surgery, take your lisinopril but not your hydrochlorothiazide.  Alternatively, you could take both medications after surgery in the morning.    Get your stool kit back to us soon.    Contact us or return if questions or concerns.     Have a nice day!        Dr. Hand

## 2019-01-10 NOTE — PROGRESS NOTES
[facility]  [id number] [place] [id number] [phone]  Dept: [phone]    PRE-OP EVALUATION:  Today's date: 2019    Addis Ferreira (: 1955) presents for pre-operative evaluation assessment as requested by Dr. Becky Bernal.  She requires evaluation and anesthesia risk assessment prior to undergoing surgery/procedure for treatment of Macular pucker  .    Fax number for surgical facility: [phone]  Primary Physician: Omar Hand  Type of Anesthesia Anticipated: to be determined    Patient has a Health Care Directive or Living Will:  NO    Preop Questions 2019   Who is doing your surgery? Dr.MVaughn Bernal   What are you having done? Vitrectomy   Date of Surgery/Procedure: 19   Facility or Hospital where procedure/surgery will be performed: [facility]    1.  Do you have a history of Heart attack, stroke, stent, coronary bypass surgery, or other heart surgery? No   2.  Do you ever have any pain or discomfort in your chest? No   3.  Do you have a history of  Heart Failure? No   4.   Are you troubled by shortness of breath when:  walking on a level surface, or up a slight hill, or at night? No   5.  Do you currently have a cold, bronchitis or other respiratory infection? No   6.  Do you have a cough, shortness of breath, or wheezing? No   7.  Do you sometimes get pains in the calves of your legs when you walk? No   8. Do you or anyone in your family have previous history of blood clots? No   9.  Do you or does anyone in your family have a serious bleeding problem such as prolonged bleeding following surgeries or cuts? No   10. Have you ever had problems with anemia or been told to take iron pills? YES - when pregnant   11. Have you had any abnormal blood loss such as black, tarry or bloody stools, or abnormal vaginal bleeding? No   12. Have you ever had a blood transfusion? No   13. Have you or any of your relatives ever had problems with  anesthesia? No   14. Do you have sleep apnea, excessive snoring or daytime drowsiness? No   15. Do you have any prosthetic heart valves? No   16. Do you have prosthetic joints? No   17. Is there any chance that you may be pregnant? No         HPI:     HPI related to upcoming procedure: Pt has had eye symptoms since at least 2016.  Has lost some vision secondary to a macular pucker when looking with one single eye.  Has been recommended to have a vitrectomy to help with this.  This is worsening gradually over time.      BP has been elevated lately.      See problem list for active medical problems.  Problems all longstanding and stable, except as noted/documented.  See ROS for pertinent symptoms related to these conditions.                                                                                                                                                          .  HYPERTENSION - Patient has longstanding history of HTN , currently denies any symptoms referable to elevated blood pressure. Specifically denies chest pain, palpitations, dyspnea, orthopnea, PND or peripheral edema. Blood pressure readings have not been in normal range. Current medication regimen is as listed below. Patient denies any side effects of medication.                                                                                                                                                                                          .    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     Priority: Medium     Advanced directives, counseling/discussion 12/21/2012     Priority: Medium     CARDIOVASCULAR SCREENING; LDL GOAL LESS THAN 160 10/31/2010     Priority: Medium     Hypertension goal BP (blood pressure) < 140/90 05/11/2007     Priority: Medium     Pityriasis rosea 08/09/2004     Priority: Medium     Irregular  menstrual cycle 2002     Priority: Medium      Past Medical History:   Diagnosis Date     Irregular menstrual cycle      Past Surgical History:   Procedure Laterality Date     HC DILATION/CURETTAGE DIAG/THER NON OB      after miscarriage     Current Outpatient Medications   Medication Sig Dispense Refill     conjugated estrogens (PREMARIN) vaginal cream Place  vaginally three times a week. 42.5 g 12     hydrochlorothiazide (HYDRODIURIL) 25 MG tablet Take 1 tablet (25 mg) by mouth daily 90 tablet 3     lisinopril (PRINIVIL/ZESTRIL) 40 MG tablet Take 1 tablet (40 mg) by mouth daily 90 tablet 0     MULTI-DAY OR TABS   0     norethindrone-ethinyl estradiol (FEMHRT /) 1-5 MG-MCG per tablet Take 1 tablet by mouth daily. 3 Package 3     OTC products: None, except as noted above and NSAIDS    Allergies   Allergen Reactions     No Known Drug Allergies       Latex Allergy: NO    Social History     Tobacco Use     Smoking status: Former Smoker     Packs/day: 1.00     Years: 30.00     Pack years: 30.00     Last attempt to quit: 2001     Years since quittin.0     Smokeless tobacco: Never Used   Substance Use Topics     Alcohol use: Yes     Comment: 0-3 drinks wkly     History   Drug Use No       REVIEW OF SYSTEMS:   CONSTITUTIONAL: NEGATIVE for fever, chills, change in weight  INTEGUMENTARY/SKIN: NEGATIVE for worrisome rashes, moles or lesions  EYES: see above  ENT/MOUTH: NEGATIVE for ear, mouth and throat problems  RESP: NEGATIVE for significant cough or SOB  BREAST: NEGATIVE for masses, tenderness or discharge  CV: NEGATIVE for chest pain, palpitations or peripheral edema  GI: NEGATIVE for nausea, abdominal pain, heartburn, or change in bowel habits  : NEGATIVE for frequency, dysuria, or hematuria  MUSCULOSKELETAL: NEGATIVE for significant arthralgias or myalgia  NEURO: dizziness/lightheadedness  ENDOCRINE: NEGATIVE for temperature intolerance, skin/hair changes  HEME: NEGATIVE for bleeding  "problems  PSYCHIATRIC: NEGATIVE for changes in mood or affect    EXAM:   /72   Pulse 68   Temp 97.2  F (36.2  C) (Temporal)   Resp 16   Ht 1.588 m (5' 2.5\")   Wt 102.1 kg (225 lb)   LMP 06/30/2003   BMI 40.50 kg/m      GENERAL APPEARANCE: healthy, alert and no distress     EYES: EOMI, PERRL     HENT: ear canals and TM's normal and nose and mouth without ulcers or lesions     NECK: no adenopathy, no asymmetry, masses, or scars and thyroid normal to palpation     RESP: lungs clear to auscultation - no rales, rhonchi or wheezes     CV: regular rates and rhythm, normal S1 S2, no S3 or S4 and no murmur, click or rub     ABDOMEN:  soft, nontender, no HSM or masses and bowel sounds normal     MS: extremities normal- no gross deformities noted, no evidence of inflammation in joints, FROM in all extremities.     SKIN: no suspicious lesions or rashes     NEURO: Normal strength and tone, sensory exam grossly normal, mentation intact and speech normal     PSYCH: mentation appears normal. and affect normal/bright     LYMPHATICS: No cervical adenopathy    DIAGNOSTICS:   EKG: appears normal, NSR, normal axis, normal intervals, no acute ST/T changes c/w ischemia, no LVH by voltage criteria, unchanged from previous tracings    Recent Labs   Lab Test 04/26/18  1002 03/23/17  0946    140   POTASSIUM 3.9 4.0   CR 0.86 0.76        IMPRESSION:   Reason for surgery/procedure: macular pucker, vitreous detachment, epiretinal membrane  Diagnosis/reason for consult: perioperative risk evaluation for vitrectomy    The proposed surgical procedure is considered LOW risk.    REVISED CARDIAC RISK INDEX  The patient has the following serious cardiovascular risks for perioperative complications such as (MI, PE, VFib and 3  AV Block):  No serious cardiac risks  INTERPRETATION: 0 risks: Class I (very low risk - 0.4% complication rate)    The patient has the following additional risks for perioperative complications:  Morbid " obesity      ICD-10-CM    1. Preop general physical exam Z01.818 EKG 12-lead complete w/read - Clinics   2. PVD (posterior vitreous detachment), right H43.811 EKG 12-lead complete w/read - Clinics   3. Epiretinal membrane (ERM) of both eyes H35.373 EKG 12-lead complete w/read - Clinics   4. Hypertension goal BP (blood pressure) < 140/90 I10 EKG 12-lead complete w/read - Clinics     lisinopril (PRINIVIL/ZESTRIL) 40 MG tablet     hydrochlorothiazide (HYDRODIURIL) 25 MG tablet   5. Morbid obesity (H) E66.01    6. Screen for colon cancer Z12.11 Fecal colorectal cancer screen (FIT)   7. Influenza vaccine refused Z28.21      4.  BP consistently not controlled, will increase lisinopril dose to 40 mg daily.      RECOMMENDATIONS:       Cardiovascular Risk  Performs 4 METs exercise without symptoms (Climb a flight of stairs) .       --Patient is to take all scheduled medications on the day of surgery EXCEPT for modifications listed below.    ACE Inhibitor or Angiotensin Receptor Blocker (ARB) Use  Ace inhibitor or Angiotensin Receptor Blocker (ARB) and will continue this medication due to the higher risk of uncontrolled perioperative hypertension      APPROVAL GIVEN to proceed with proposed procedure, without further diagnostic evaluation       Signed Electronically by: Omar Hand MD, MD    Copy of this evaluation report is provided to requesting physician.    [facility] Preop Guidelines    Revised Cardiac Risk Index

## 2019-01-17 ENCOUNTER — OFFICE VISIT (OUTPATIENT)
Dept: FAMILY MEDICINE | Facility: OTHER | Age: 64
End: 2019-01-17
Payer: COMMERCIAL

## 2019-01-17 VITALS
BODY MASS INDEX: 39.87 KG/M2 | WEIGHT: 225 LBS | HEIGHT: 63 IN | RESPIRATION RATE: 16 BRPM | SYSTOLIC BLOOD PRESSURE: 172 MMHG | HEART RATE: 68 BPM | DIASTOLIC BLOOD PRESSURE: 72 MMHG | TEMPERATURE: 97.2 F

## 2019-01-17 DIAGNOSIS — H35.373 EPIRETINAL MEMBRANE (ERM) OF BOTH EYES: ICD-10-CM

## 2019-01-17 DIAGNOSIS — E66.01 MORBID OBESITY (H): ICD-10-CM

## 2019-01-17 DIAGNOSIS — Z28.21 INFLUENZA VACCINE REFUSED: ICD-10-CM

## 2019-01-17 DIAGNOSIS — I10 HYPERTENSION GOAL BP (BLOOD PRESSURE) < 140/90: ICD-10-CM

## 2019-01-17 DIAGNOSIS — Z12.11 SCREEN FOR COLON CANCER: ICD-10-CM

## 2019-01-17 DIAGNOSIS — H43.811 PVD (POSTERIOR VITREOUS DETACHMENT), RIGHT: ICD-10-CM

## 2019-01-17 DIAGNOSIS — Z01.818 PREOP GENERAL PHYSICAL EXAM: Primary | ICD-10-CM

## 2019-01-17 PROCEDURE — 99215 OFFICE O/P EST HI 40 MIN: CPT | Performed by: FAMILY MEDICINE

## 2019-01-17 PROCEDURE — 93000 ELECTROCARDIOGRAM COMPLETE: CPT | Performed by: FAMILY MEDICINE

## 2019-01-17 RX ORDER — LISINOPRIL 40 MG/1
40 TABLET ORAL DAILY
Qty: 90 TABLET | Refills: 0 | Status: SHIPPED | OUTPATIENT
Start: 2019-01-17 | End: 2019-04-04

## 2019-01-17 RX ORDER — HYDROCHLOROTHIAZIDE 25 MG/1
25 TABLET ORAL DAILY
Qty: 90 TABLET | Refills: 3 | Status: SHIPPED | OUTPATIENT
Start: 2019-01-17 | End: 2020-01-13

## 2019-01-17 ASSESSMENT — MIFFLIN-ST. JEOR: SCORE: 1546.78

## 2019-01-17 ASSESSMENT — PAIN SCALES - GENERAL: PAINLEVEL: NO PAIN (0)

## 2019-03-26 NOTE — PROGRESS NOTES
SUBJECTIVE:   Addsi Ferreira is a 64 year old female who presents to clinic today for the following health issues:    Follow up on lisinopril.     History of Present Illness     Hypertension:     Outpatient blood pressures:  Are not being checked    Dietary sodium intake::  Low salt diet    Diet:  Regular (no restrictions)  Frequency of exercise:  None  Taking medications regularly:  Yes  Medication side effects:  Other  Additional concerns today:  No    Pt here to f/u on her BP.  Denies cough from the lisinopril.  Denies dizziness, lightheadedness, other side effects from the lisinopril.      Still taking her hormone replacement.      Taking hormone replacement    Problem list and histories reviewed & adjusted, as indicated.  Additional history: as documented      Current Outpatient Medications   Medication Sig Dispense Refill     conjugated estrogens (PREMARIN) vaginal cream Place  vaginally three times a week. 42.5 g 12     hydrochlorothiazide (HYDRODIURIL) 25 MG tablet Take 1 tablet (25 mg) by mouth daily 90 tablet 3     lisinopril (PRINIVIL/ZESTRIL) 40 MG tablet Take 1 tablet (40 mg) by mouth daily 90 tablet 0     MULTI-DAY OR TABS   0     norethindrone-ethinyl estradiol (FEMHRT 1/5) 1-5 MG-MCG per tablet Take 1 tablet by mouth daily. 3 Package 3     Recent Labs   Lab Test 04/26/18  1002 03/23/17  0946 01/14/16  0831 01/15/15  1028 01/15/15  1013 01/02/14  0917   LDL  --  135* 119* 120  --  123   HDL  --  37* 40* 42*  --  40*   TRIG  --  160* 220* 207*  --  186*   ALT  --   --   --   --  25 27   CR 0.86 0.76 0.85  --  0.85 0.84   GFRESTIMATED 67 78 69  --  69 70   GFRESTBLACK 81 >90   GFR Calc   83  --  83 85   POTASSIUM 3.9 4.0 4.0  --  4.0 4.4      BP Readings from Last 3 Encounters:   04/04/19 138/70   01/17/19 172/72   04/26/18 150/82    Wt Readings from Last 3 Encounters:   04/04/19 100.7 kg (222 lb 1.6 oz)   01/17/19 102.1 kg (225 lb)   04/26/18 100.6 kg (221 lb 12.8 oz)                 "    ROS:  Constitutional, HEENT, cardiovascular, pulmonary, gi and gu systems are negative, except as otherwise noted.    OBJECTIVE:     /70 (BP Location: Left arm, Patient Position: Chair, Cuff Size: Adult Large)   Pulse 54   Temp 98.3  F (36.8  C) (Temporal)   Resp 16   Ht 1.613 m (5' 3.5\")   Wt 100.7 kg (222 lb 1.6 oz)   LMP 06/30/2003   SpO2 97%   Breastfeeding? No   BMI 38.73 kg/m    Body mass index is 38.73 kg/m .  GENERAL: healthy, alert and no distress  NECK: no adenopathy, no asymmetry, masses, or scars and thyroid normal to palpation  RESP: lungs clear to auscultation - no rales, rhonchi or wheezes  CV: regular rate and rhythm, normal S1 S2, no S3 or S4, no murmur, click or rub, no peripheral edema and peripheral pulses strong  ABDOMEN: soft, nontender, no hepatosplenomegaly, no masses and bowel sounds normal  MS: no gross musculoskeletal defects noted, no edema    Diagnostic Test Results:  Results for orders placed or performed during the hospital encounter of 05/03/18   MA Screening Digital Bilateral    Narrative    SCREENING MAMMOGRAM, BILATERAL, DIGITAL w/CAD - 5/3/2018 8:11 AM    BREAST SYMPTOMS: No current breast complaints.     COMPARISON:  11-1-2002.    BREAST DENSITY: Scattered fibroglandular densities.    COMMENTS: No findings of suspicion for malignancy.       Impression    IMPRESSION: BI-RADS CATEGORY: 1 -  Negative    RECOMMENDED FOLLOW-UP: Annual Mammography.    Exam results letter mailed to patient.      CLYDE MCKAY MD       ASSESSMENT/PLAN:     BMI:   Estimated body mass index is 38.73 kg/m  as calculated from the following:    Height as of this encounter: 1.613 m (5' 3.5\").    Weight as of this encounter: 100.7 kg (222 lb 1.6 oz).   Weight management plan: Discussed healthy diet and exercise guidelines        ICD-10-CM    1. Hypertension goal BP (blood pressure) < 140/90 I10 Comprehensive metabolic panel     lisinopril (PRINIVIL/ZESTRIL) 40 MG tablet   2. Hormone " "replacement therapy (HRT) Z79.890    3. CARDIOVASCULAR SCREENING; LDL GOAL LESS THAN 160 Z13.6 Lipid panel reflex to direct LDL Fasting   4. Screen for colon cancer Z12.11 Fecal colorectal cancer screen (FIT)     1.  Adequate control currently.  Encouraged efforts at lifestyle modification.  We will continue lisinopril and check labs today.  2.  Patient continues on hormone replacement therapy.  We discussed the risks and benefits of continued therapy.  This is primarily managed by her gynecologist.  We will continue to follow as needed.  3.  Screening labs obtained.  4.  A fit kit ordered.    Portions of this note were completed using Dragon dictation software.  Although reviewed, there may be typographical and other inadvertent errors that remain.         Patient Instructions   Thank you for visiting [facility]    Keep working on losing weight, exercising, eating healthy.    Get your colon test back to us ASA.    We'll let you know your lab results as soon as we can.     Let us know if you'd like any of the immunizations we discussed.    Pap is due.    Contact us or return if questions or concerns.     Have a nice day!    Dr. Hand     Please Follow Up when indicated on the section below this one on your After-Visit Summary.      If you had imaging scheduled please refer to your radiology prep sheet.    Appointment    Date_______________     Time_____________    Day:   M TU W TH F    With____________________________    Location_________________________    If you need medication refills, please contact your pharmacy 3 days before your prescriptions runs out or download the [facility] aubree for your smart phone.   If you are out of refills, your pharmacy will contact contact the clinic.    Contact us or return if questions or concerns.     -Your [facility] Care Team:    MD Josey Leonard PA-C Joel De Haan, PA-C Anna Niesen, PA-C Elizabeth \"Inna\" Kearney, " APRN DEDRICK Hall, APRN, DEDRICK Higginbotham, APRN, DEDRICK Jean, RN, BSN       General information about your   [facility]      Clinic hours:     Lab hours:  Phone [phone]  Monday 7:30 am-7 pm    Monday 8:30 am-6:30 pm  Tuesday-Friday 7:30 am-5 pm   Tuesday-Friday 8:30 am-4:30 pm    Pharmacy hours:  Phone [phone]  Monday 8:30 am-7pm  Tuesday-Friday 8:30am-6 pm                                     Mychart assistance [phone]    We would like to hear from you, how was your visit today?    Radha Stewart  Patient Information Supervisor   Patient Care Supervisor  [facility], and [facility], [facility]  [phone]          Omar Hand MD, MD  [facility]

## 2019-04-04 ENCOUNTER — OFFICE VISIT (OUTPATIENT)
Dept: FAMILY MEDICINE | Facility: OTHER | Age: 64
End: 2019-04-04
Payer: COMMERCIAL

## 2019-04-04 ENCOUNTER — TELEPHONE (OUTPATIENT)
Dept: FAMILY MEDICINE | Facility: OTHER | Age: 64
End: 2019-04-04

## 2019-04-04 VITALS
HEART RATE: 54 BPM | DIASTOLIC BLOOD PRESSURE: 70 MMHG | RESPIRATION RATE: 16 BRPM | SYSTOLIC BLOOD PRESSURE: 138 MMHG | TEMPERATURE: 98.3 F | HEIGHT: 64 IN | BODY MASS INDEX: 37.92 KG/M2 | WEIGHT: 222.1 LBS | OXYGEN SATURATION: 97 %

## 2019-04-04 DIAGNOSIS — Z12.11 SCREEN FOR COLON CANCER: ICD-10-CM

## 2019-04-04 DIAGNOSIS — Z79.890 HORMONE REPLACEMENT THERAPY (HRT): ICD-10-CM

## 2019-04-04 DIAGNOSIS — I10 HYPERTENSION GOAL BP (BLOOD PRESSURE) < 140/90: Primary | ICD-10-CM

## 2019-04-04 DIAGNOSIS — Z13.6 CARDIOVASCULAR SCREENING; LDL GOAL LESS THAN 160: ICD-10-CM

## 2019-04-04 LAB
ALBUMIN SERPL-MCNC: 3.7 G/DL (ref 3.4–5)
ALP SERPL-CCNC: 73 U/L (ref 40–150)
ALT SERPL W P-5'-P-CCNC: 21 U/L (ref 0–50)
ANION GAP SERPL CALCULATED.3IONS-SCNC: 11 MMOL/L (ref 3–14)
AST SERPL W P-5'-P-CCNC: 11 U/L (ref 0–45)
BILIRUB SERPL-MCNC: 0.3 MG/DL (ref 0.2–1.3)
BUN SERPL-MCNC: 13 MG/DL (ref 7–30)
CALCIUM SERPL-MCNC: 9.1 MG/DL (ref 8.5–10.1)
CHLORIDE SERPL-SCNC: 106 MMOL/L (ref 94–109)
CHOLEST SERPL-MCNC: 176 MG/DL
CO2 SERPL-SCNC: 24 MMOL/L (ref 20–32)
CREAT SERPL-MCNC: 0.73 MG/DL (ref 0.52–1.04)
GFR SERPL CREATININE-BSD FRML MDRD: 88 ML/MIN/{1.73_M2}
GLUCOSE SERPL-MCNC: 93 MG/DL (ref 70–99)
HDLC SERPL-MCNC: 39 MG/DL
LDLC SERPL CALC-MCNC: 109 MG/DL
NONHDLC SERPL-MCNC: 137 MG/DL
POTASSIUM SERPL-SCNC: 3.8 MMOL/L (ref 3.4–5.3)
PROT SERPL-MCNC: 7.4 G/DL (ref 6.8–8.8)
SODIUM SERPL-SCNC: 141 MMOL/L (ref 133–144)
TRIGL SERPL-MCNC: 141 MG/DL

## 2019-04-04 PROCEDURE — 36415 COLL VENOUS BLD VENIPUNCTURE: CPT | Performed by: FAMILY MEDICINE

## 2019-04-04 PROCEDURE — 80053 COMPREHEN METABOLIC PANEL: CPT | Performed by: FAMILY MEDICINE

## 2019-04-04 PROCEDURE — 99214 OFFICE O/P EST MOD 30 MIN: CPT | Performed by: FAMILY MEDICINE

## 2019-04-04 PROCEDURE — 80061 LIPID PANEL: CPT | Performed by: FAMILY MEDICINE

## 2019-04-04 RX ORDER — LISINOPRIL 40 MG/1
40 TABLET ORAL DAILY
Qty: 90 TABLET | Refills: 3 | Status: SHIPPED | OUTPATIENT
Start: 2019-04-04 | End: 2020-03-20

## 2019-04-04 ASSESSMENT — MIFFLIN-ST. JEOR: SCORE: 1544.5

## 2019-04-04 NOTE — RESULT ENCOUNTER NOTE
All of your labs were normal for you.    Have a nice day!    Dr. Hand      The 10-year ASCVD risk score (Rashaad KWONG Jr., et al., 2013) is: 7%    Values used to calculate the score:      Age: 64 years      Sex: Female      Is Non- : No      Diabetic: No      Tobacco smoker: No      Systolic Blood Pressure: 138 mmHg      Is BP treated: Yes      HDL Cholesterol: 39 mg/dL      Total Cholesterol: 176 mg/dL

## 2019-04-04 NOTE — PATIENT INSTRUCTIONS
"Thank you for visiting [facility]    Keep working on losing weight, exercising, eating healthy.    Get your colon test back to us ASAP.    We'll let you know your lab results as soon as we can.     Let us know if you'd like any of the immunizations we discussed.    Pap is due.    Contact us or return if questions or concerns.     Have a nice day!    Dr. Hand     Please Follow Up when indicated on the section below this one on your After-Visit Summary.      If you had imaging scheduled please refer to your radiology prep sheet.    Appointment    Date_______________     Time_____________    Day:   M TU W TH F    With____________________________    Location_________________________    If you need medication refills, please contact your pharmacy 3 days before your prescriptions runs out or download the [facility] aubree for your smart phone.   If you are out of refills, your pharmacy will contact contact the clinic.    Contact us or return if questions or concerns.     -Your [facility] Care Team:    MD Josey Leonard, ROSITA Llanes PA-C Elizabeth \"Inna\" VINICIUS Kearney CNP APRN, DEDRICK Higginbotham APRN, DEDRICK Jean, RN, BSN       General information about your   [facility]      Clinic hours:     Lab hours:  Phone [phone]  Monday 7:30 am-7 pm    Monday 8:30 am-6:30 pm  Tuesday-Friday 7:30 am-5 pm   Tuesday-Friday 8:30 am-4:30 pm    Pharmacy hours:  Phone [phone]  Monday 8:30 am-7pm  Tuesday-Friday 8:30am-6 pm                                     Mychart assistance [phone]    We would like to hear from you, how was your visit today?    Radha Stewart  Patient Information Supervisor   Patient Care Supervisor  [facility] and [facility], and [facility]  [phone]      "

## 2019-04-04 NOTE — TELEPHONE ENCOUNTER
Notes recorded by Donna Villafuerte CMA on 4/4/2019 at 4:32 PM CDT  Left message for patient to return call.     Donna Villafuerte MA     ------    Notes recorded by Omar Hand MD on 4/4/2019 at 4:29 PM CDT  All of your labs were normal for you.    Have a nice day!    Dr. Hand      The 10-year ASCVD risk score (Rashaad KWONG Jr., et al., 2013) is: 7%    Values used to calculate the score:      Age: 64 years      Sex: Female      Is Non- : No      Diabetic: No      Tobacco smoker: No      Systolic Blood Pressure: 138 mmHg      Is BP treated: Yes      HDL Cholesterol: 39 mg/dL      Total Cholesterol: 176 mg/dL

## 2019-04-05 NOTE — TELEPHONE ENCOUNTER
Attempted to call patient but unable to leave a message at this time. Will try again later.     Donna Villafuerte MA

## 2019-04-07 PROBLEM — Z79.890 HORMONE REPLACEMENT THERAPY (HRT): Status: ACTIVE | Noted: 2019-04-07

## 2019-06-18 ENCOUNTER — TRANSFERRED RECORDS (OUTPATIENT)
Dept: HEALTH INFORMATION MANAGEMENT | Facility: CLINIC | Age: 64
End: 2019-06-18

## 2019-09-30 NOTE — PATIENT INSTRUCTIONS
Before Your Surgery      Call your surgeon if there is any change in your health. This includes signs of a cold or flu (such as a sore throat, runny nose, cough, rash or fever).    Do not smoke, drink alcohol or take over the counter medicine (unless your surgeon or primary care doctor tells you to) for the 24 hours before and after surgery.    If you take prescribed drugs: Follow your doctor s orders about which medicines to take and which to stop until after surgery.    Eating and drinking prior to surgery: follow the instructions from your surgeon    Take a shower or bath the night before surgery. Use the soap your surgeon gave you to gently clean your skin. If you do not have soap from your surgeon, use your regular soap. Do not shave or scrub the surgery site.  Wear clean pajamas and have clean sheets on your bed.     Try to avoid ibuprofen for 24 hours before your procedure.    Skip your lisinopril the morning of your procedure.      Consider skipping your hormones the day of surgery as well.    Take any medications with a sip of water.    Contact us or return if questions or concerns.     Have a nice day!        Dr. Hand       Preventive Health Recommendations  Female Ages 50 - 64    Yearly exam: See your health care provider every year in order to  Review health changes.   Discuss preventive care.    Review your medicines if your doctor has prescribed any.    Get a Pap test every three years (unless you have an abnormal result and your provider advises testing more often).  If you get Pap tests with HPV test, you only need to test every 5 years, unless you have an abnormal result.   You do not need a Pap test if your uterus was removed (hysterectomy) and you have not had cancer.  You should be tested each year for STDs (sexually transmitted diseases) if you're at risk.   Have a mammogram every 1 to 2 years.  Have a colonoscopy at age 50, or have a yearly FIT test (stool test). These exams screen for colon  cancer.    Have a cholesterol test every 5 years, or more often if advised.  Have a diabetes test (fasting glucose) every three years. If you are at risk for diabetes, you should have this test more often.   If you are at risk for osteoporosis (brittle bone disease), think about having a bone density scan (DEXA).    Shots: Get a flu shot each year. Get a tetanus shot every 10 years.    Nutrition:   Eat at least 5 servings of fruits and vegetables each day.  Eat whole-grain bread, whole-wheat pasta and brown rice instead of white grains and rice.  Get adequate Calcium and Vitamin D.     Lifestyle  Exercise at least 150 minutes a week (30 minutes a day, 5 days a week). This will help you control your weight and prevent disease.  Limit alcohol to one drink per day.  No smoking.   Wear sunscreen to prevent skin cancer.   See your dentist every six months for an exam and cleaning.  See your eye doctor every 1 to 2 years.

## 2019-09-30 NOTE — PROGRESS NOTES
SUBJECTIVE:   CC: Addis Ferreira is an 64 year old woman who presents for preventive health visit.   The 10-year ASCVD risk score (Rashaad KWONG Jr., et al., 2013) is: 6.8%    Values used to calculate the score:      Age: 64 years      Sex: Female      Is Non- : No      Diabetic: No      Tobacco smoker: No      Systolic Blood Pressure: 136 mmHg      Is BP treated: Yes      HDL Cholesterol: 39 mg/dL      Total Cholesterol: 176 mg/dL   Patient is eligible for use of low-dose aspirin for primary prevention of heart attack and stroke.  Provider has discussed aspirin with patient and our decision was:     Intentionally Not Prescribe:  Daily low-dose aspirin recommended for primary prevention, patient declines plan.        Healthy Habits:     Getting at least 3 servings of Calcium per day:  NO    Bi-annual eye exam:  Yes    Dental care twice a year:  Yes    Sleep apnea or symptoms of sleep apnea:  Daytime drowsiness    Diet:  Regular (no restrictions)    Frequency of exercise:  None    Taking medications regularly:  Yes    Medication side effects:  None    PHQ-2 Total Score: 0    Additional concerns today:  No    Denies non-restorative sleep, denies snoring except on her back.  No witnessed apneas.        Today's PHQ-2 Score:   PHQ-2 (  [employer]) 10/3/2019   Q1: Little interest or pleasure in doing things 0   Q2: Feeling down, depressed or hopeless 0   PHQ-2 Score 0   Q1: Little interest or pleasure in doing things Not at all   Q2: Feeling down, depressed or hopeless Not at all   PHQ-2 Score 0       Abuse: Current or Past(Physical, Sexual or Emotional)- No  Do you feel safe in your environment? Yes    Social History     Tobacco Use     Smoking status: Former Smoker     Packs/day: 1.00     Years: 30.00     Pack years: 30.00     Last attempt to quit: 2001     Years since quittin.7     Smokeless tobacco: Never Used   Substance Use Topics     Alcohol use: Yes     Comment: 0-3 drinks wkly      If you drink alcohol do you typically have >3 drinks per day or >7 drinks per week? No    Alcohol Use 10/3/2019   Prescreen: >3 drinks/day or >7 drinks/week? No   Prescreen: >3 drinks/day or >7 drinks/week? -   No flowsheet data found.    Reviewed orders with patient.  Reviewed health maintenance and updated orders accordingly - Yes  BP Readings from Last 3 Encounters:   10/03/19 136/78   19 138/70   19 172/72    Wt Readings from Last 3 Encounters:   10/03/19 105.2 kg (232 lb)   19 100.7 kg (222 lb 1.6 oz)   19 102.1 kg (225 lb)                  Patient Active Problem List   Diagnosis     Pityriasis rosea     Hypertension goal BP (blood pressure) < 140/90     CARDIOVASCULAR SCREENING; LDL GOAL LESS THAN 160     Advanced directives, counseling/discussion     Clear cell hidradenoma     Morbid obesity (H)     Hormone replacement therapy (HRT)     Past Surgical History:   Procedure Laterality Date     HC DILATION/CURETTAGE DIAG/THER NON OB      after miscarriage       Social History     Tobacco Use     Smoking status: Former Smoker     Packs/day: 1.00     Years: 30.00     Pack years: 30.00     Last attempt to quit: 2001     Years since quittin.7     Smokeless tobacco: Never Used   Substance Use Topics     Alcohol use: Yes     Comment: 0-3 drinks wkly     Family History   Problem Relation Age of Onset     Hypertension Father      Cerebrovascular Disease Paternal Grandmother      Cerebrovascular Disease Paternal Grandfather          Current Outpatient Medications   Medication Sig Dispense Refill     conjugated estrogens (PREMARIN) vaginal cream Place  vaginally three times a week. 42.5 g 12     hydrochlorothiazide (HYDRODIURIL) 25 MG tablet Take 1 tablet (25 mg) by mouth daily 90 tablet 3     lisinopril (PRINIVIL/ZESTRIL) 40 MG tablet Take 1 tablet (40 mg) by mouth daily 90 tablet 3     MULTI-DAY OR TABS   0     norethindrone-ethinyl estradiol (FEMHRT 1/5) 1-5 MG-MCG per tablet  Take 1 tablet by mouth daily. 3 Package 3     Allergies   Allergen Reactions     No Known Drug Allergies      Recent Labs   Lab Test 04/04/19  0940 04/26/18  1002 03/23/17  0946 01/14/16  0831  01/15/15  1013 01/02/14  0917   *  --  135* 119*   < >  --  123   HDL 39*  --  37* 40*   < >  --  40*   TRIG 141  --  160* 220*   < >  --  186*   ALT 21  --   --   --   --  25 27   CR 0.73 0.86 0.76 0.85  --  0.85 0.84   GFRESTIMATED 88 67 78 69  --  69 70   GFRESTBLACK >90 81 >90   GFR Calc   83  --  83 85   POTASSIUM 3.8 3.9 4.0 4.0  --  4.0 4.4    < > = values in this interval not displayed.        Mammogram Screening: Patient over age 50, mutual decision to screen reflected in health maintenance.    Pertinent mammograms are reviewed under the imaging tab.  History of abnormal Pap smear: NO - age 30-65 PAP every 5 years with negative HPV co-testing recommended     Reviewed and updated as needed this visit by clinical staff  Tobacco  Allergies  Meds  Med Hx  Surg Hx  Fam Hx  Soc Hx        Reviewed and updated as needed this visit by Provider  Meds            Review of Systems   Constitutional: Negative for chills and fever.   HENT: Negative for congestion, ear pain, hearing loss and sore throat.    Eyes: Positive for visual disturbance. Negative for pain.   Respiratory: Negative for cough and shortness of breath.    Cardiovascular: Negative for chest pain, palpitations and peripheral edema.   Gastrointestinal: Negative for abdominal pain, constipation, diarrhea, heartburn, hematochezia and nausea.   Breasts:  Negative for tenderness, breast mass and discharge.   Genitourinary: Positive for frequency. Negative for dysuria, genital sores, hematuria, pelvic pain, urgency, vaginal bleeding and vaginal discharge.   Musculoskeletal: Negative for arthralgias, joint swelling and myalgias.   Skin: Negative for rash.   Neurological: Negative for dizziness, weakness, headaches and paresthesias.  "  Psychiatric/Behavioral: Negative for mood changes. The patient is not nervous/anxious.           OBJECTIVE:   /78   Pulse 68   Temp 97.1  F (36.2  C) (Temporal)   Resp 16   Ht 1.6 m (5' 2.99\")   Wt 105.2 kg (232 lb)   LMP 06/30/2003   SpO2 97%   BMI 41.11 kg/m    Physical Exam  GENERAL: healthy, alert and no distress  EYES: Eyes grossly normal to inspection, PERRL and conjunctivae and sclerae normal  HENT: ear canals and TM's normal, nose and mouth without ulcers or lesions  NECK: no adenopathy, no asymmetry, masses, or scars and thyroid normal to palpation  RESP: lungs clear to auscultation - no rales, rhonchi or wheezes  BREAST: declined by patient.  Will see GYN for this.  CV: regular rate and rhythm, normal S1 S2, no S3 or S4, no murmur, click or rub, no peripheral edema and peripheral pulses strong  ABDOMEN: soft, nontender, no hepatosplenomegaly, no masses and bowel sounds normal  MS: no gross musculoskeletal defects noted, no edema  SKIN: no suspicious lesions or rashes  NEURO: Normal strength and tone, mentation intact and speech normal  PSYCH: mentation appears normal, affect normal/bright    Diagnostic Test Results:  Labs reviewed in Epic  Results for orders placed or performed in visit on 04/04/19   Lipid panel reflex to direct LDL Fasting   Result Value Ref Range    Cholesterol 176 <200 mg/dL    Triglycerides 141 <150 mg/dL    HDL Cholesterol 39 (L) >49 mg/dL    LDL Cholesterol Calculated 109 (H) <100 mg/dL    Non HDL Cholesterol 137 (H) <130 mg/dL   Comprehensive metabolic panel   Result Value Ref Range    Sodium 141 133 - 144 mmol/L    Potassium 3.8 3.4 - 5.3 mmol/L    Chloride 106 94 - 109 mmol/L    Carbon Dioxide 24 20 - 32 mmol/L    Anion Gap 11 3 - 14 mmol/L    Glucose 93 70 - 99 mg/dL    Urea Nitrogen 13 7 - 30 mg/dL    Creatinine 0.73 0.52 - 1.04 mg/dL    GFR Estimate 88 >60 mL/min/[1.73_m2]    GFR Estimate If Black >90 >60 mL/min/[1.73_m2]    Calcium 9.1 8.5 - 10.1 mg/dL    " Bilirubin Total 0.3 0.2 - 1.3 mg/dL    Albumin 3.7 3.4 - 5.0 g/dL    Protein Total 7.4 6.8 - 8.8 g/dL    Alkaline Phosphatase 73 40 - 150 U/L    ALT 21 0 - 50 U/L    AST 11 0 - 45 U/L       ASSESSMENT/PLAN:       ICD-10-CM    1. Preop general physical exam Z01.818    2. 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 Hypertension goal BP (blood pressure) < 140/90 I10 Basic metabolic panel     CBC with platelets   4. Morbid obesity (H) E66.01 Basic metabolic panel   5. 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 Hormone replacement therapy (HRT) Z79.890    7. Routine general medical examination at a health care facility Z00.00 Fecal colorectal cancer screen FIT     Basic metabolic panel     CBC with platelets   8. Special screening for malignant neoplasms, colon Z12.11 Fecal colorectal cancer screen FIT     1, 2.  See preop note.  3.  Currently controlled.  Will continue current regimen check monitoring labs today.  4.  Encouraged increased activity and dietary changes to help with weight loss.  5.  In the distant history.  Placed for informational purposes for her preop.  6.  Primarily managed elsewhere.  Discussed briefly the risks of increased clotting with this.  She will discuss with her gynecologist at her next visit.  7.  See above.  8.  Screened.    Portions of this note were completed using Dragon dictation software.  Although reviewed, there may be typographical and other inadvertent errors that remain.         COUNSELING:  Reviewed preventive health counseling, as reflected in patient instructions       Regular exercise       Healthy diet/nutrition       Immunizations    Pt deferred flu and Shingles vaccine for now.             Aspirin Prophylaxsis       Osteoporosis Prevention/Bone Health       Colon cancer screening       The 10-year ASCVD risk score (Rashaad KWONG JrKerry, et al., 2013) is: 6.8%    Values used to calculate the score:      Age: 64 years      Sex: Female      Is  "Non- : No      Diabetic: No      Tobacco smoker: No      Systolic Blood Pressure: 136 mmHg      Is BP treated: Yes      HDL Cholesterol: 39 mg/dL      Total Cholesterol: 176 mg/dL    Estimated body mass index is 41.11 kg/m  as calculated from the following:    Height as of this encounter: 1.6 m (5' 2.99\").    Weight as of this encounter: 105.2 kg (232 lb).    Weight management plan: Discussed healthy diet and exercise guidelines     reports that she quit smoking about 18 years ago. She has a 30.00 pack-year smoking history. She has never used smokeless tobacco.      Counseling Resources:  ATP IV Guidelines  Pooled Cohorts Equation Calculator  Breast Cancer Risk Calculator  FRAX Risk Assessment  ICSI Preventive Guidelines  Dietary Guidelines for Americans, 2010  USDA's MyPlate  ASA Prophylaxis  Lung CA Screening    Omar Hand MD, MD  [facility]  "

## 2019-09-30 NOTE — PROGRESS NOTES
[facility]  [id number] [place] [id number] [phone]  Dept: [phone]    PRE-OP EVALUATION:  Today's date: 10/3/2019    Addis Ferreira (: 1955) presents for pre-operative evaluation assessment as requested by Dr. Leonard.  She requires evaluation and anesthesia risk assessment prior to undergoing surgery/procedure for treatment of cataracts.    Fax number for surgical facility: [phone]  Primary Physician: Omar Hand  Type of Anesthesia Anticipated: to be determined    Patient has a Health Care Directive or Living Will:  NO    Preop Questions 10/3/2019   Who is doing your surgery? Dr. Cr Leonard   What are you having done? [facility]   Date of Surgery/Procedure: 10/9/19   Facility or Hospital where procedure/surgery [facility] be [facility]   1.  Do you have a history of Heart attack, stroke, stent, coronary bypass surgery, or other heart surgery? No   2.  Do you ever have any pain or discomfort in your chest? No   3.  Do you have a history of  Heart Failure? No   4.   Are you troubled by shortness of breath when:  walking on a level surface, or up a slight hill, or at night? No   5.  Do you currently have a cold, bronchitis or other respiratory infection? YES - cold (resolving)   6.  Do you have a cough, shortness of breath, or wheezing? No   7.  Do you sometimes get pains in the calves of your legs when you walk? No   8. Do you or anyone in your family have previous history of blood clots? No   9.  Do you or does anyone in your family have a serious bleeding problem such as prolonged bleeding following surgeries or cuts? No   10. Have you ever had problems with anemia or been told to take iron pills? YES - with pregnancies    11. Have you had any abnormal blood loss such as black, tarry or bloody stools, or abnormal vaginal bleeding? YES - abnormal vaginal bleeding    12. Have you ever had a blood  transfusion? No   13. Have you or any of your relatives ever had problems with anesthesia? No   14. Do you have sleep apnea, excessive snoring or daytime drowsiness? No   15. Do you have any prosthetic heart valves? No   16. Do you have prosthetic joints? No   17. Is there any chance that you may be pregnant? No         HPI:     HPI related to upcoming procedure: Pt has a macular wrinkle.  Has been seeing a retina specialist for a while.  Had surgery in January 2019 for this, told that it would speed her cataract in her right eye.  Now having issues, so is going to have cataract surgery for this.      See problem list for active medical problems.  Problems all longstanding and stable, except as noted/documented.  See ROS for pertinent symptoms related to these conditions.    HYPERTENSION - Patient has longstanding history of HTN , currently denies any symptoms referable to elevated blood pressure. Specifically denies chest pain, palpitations, dyspnea, orthopnea, PND or peripheral edema. Blood pressure readings have been in normal range. Current medication regimen is as listed below. Patient denies any side effects of medication.       MEDICAL HISTORY:     Patient Active Problem List    Diagnosis Date Noted     Hormone replacement therapy (HRT) 04/07/2019     Priority: Medium     Morbid obesity (H) 01/17/2019     Priority: Medium     Clear cell hidradenoma 03/28/2017     Priority: Medium     Advanced directives, counseling/discussion 12/21/2012     Priority: Medium     CARDIOVASCULAR SCREENING; LDL GOAL LESS THAN 160 10/31/2010     Priority: Medium     Hypertension goal BP (blood pressure) < 140/90 05/11/2007     Priority: Medium     Pityriasis rosea 08/09/2004     Priority: Medium      Past Medical History:   Diagnosis Date     Irregular menstrual cycle      Past Surgical History:   Procedure Laterality Date     HC DILATION/CURETTAGE DIAG/THER NON OB  1998    after miscarriage     Current Outpatient Medications  "  Medication Sig Dispense Refill     conjugated estrogens (PREMARIN) vaginal cream Place  vaginally three times a week. 42.5 g 12     hydrochlorothiazide (HYDRODIURIL) 25 MG tablet Take 1 tablet (25 mg) by mouth daily 90 tablet 3     lisinopril (PRINIVIL/ZESTRIL) 40 MG tablet Take 1 tablet (40 mg) by mouth daily 90 tablet 3     MULTI-DAY OR TABS   0     norethindrone-ethinyl estradiol (FEMHRT ) 1-5 MG-MCG per tablet Take 1 tablet by mouth daily. 3 Package 3     OTC products: NSAIDS    Allergies   Allergen Reactions     No Known Drug Allergies       Latex Allergy: NO    Social History     Tobacco Use     Smoking status: Former Smoker     Packs/day: 1.00     Years: 30.00     Pack years: 30.00     Last attempt to quit: 2001     Years since quittin.7     Smokeless tobacco: Never Used   Substance Use Topics     Alcohol use: Yes     Comment: 0-3 drinks wkly     History   Drug Use No       REVIEW OF SYSTEMS:   CONSTITUTIONAL: NEGATIVE for fever, chills, change in weight  INTEGUMENTARY/SKIN: NEGATIVE for worrisome rashes, moles or lesions  EYES: see above  ENT/MOUTH: NEGATIVE for ear, mouth and throat problems  RESP: NEGATIVE for significant cough or SOB  BREAST: NEGATIVE for masses, tenderness or discharge  CV: NEGATIVE for chest pain, palpitations or peripheral edema  GI: NEGATIVE for nausea, abdominal pain, heartburn, or change in bowel habits  : NEGATIVE for frequency, dysuria, or hematuria  MUSCULOSKELETAL: NEGATIVE for significant arthralgias or myalgia  NEURO: NEGATIVE for weakness, dizziness or paresthesias  ENDOCRINE: NEGATIVE for temperature intolerance, skin/hair changes  HEME: NEGATIVE for bleeding problems  PSYCHIATRIC: NEGATIVE for changes in mood or affect    EXAM:   /78   Pulse 68   Temp 97.1  F (36.2  C) (Temporal)   Resp 16   Ht 1.6 m (5' 2.99\")   Wt 105.2 kg (232 lb)   LMP 2003   SpO2 97%   BMI 41.11 kg/m      GENERAL APPEARANCE: healthy, alert and no distress     EYES: " EOMI, PERRL     HENT: ear canals and TM's normal and nose and mouth without ulcers or lesions     NECK: no adenopathy, no asymmetry, masses, or scars and thyroid normal to palpation     RESP: lungs clear to auscultation - no rales, rhonchi or wheezes     CV: regular rates and rhythm, normal S1 S2, no S3 or S4 and no murmur, click or rub     ABDOMEN:  soft, nontender, no HSM or masses and bowel sounds normal     MS: extremities normal- no gross deformities noted, no evidence of inflammation in joints, FROM in all extremities.     SKIN: no suspicious lesions or rashes     NEURO: Normal strength and tone, sensory exam grossly normal, mentation intact and speech normal     PSYCH: mentation appears normal. and affect normal/bright     LYMPHATICS: No cervical adenopathy    DIAGNOSTICS:   No labs or EKG required for low risk surgery (cataract, skin procedure, breast biopsy, etc)    Recent Labs   Lab Test 04/04/19  0940 04/26/18  1002    138   POTASSIUM 3.8 3.9   CR 0.73 0.86        IMPRESSION:   Reason for surgery/procedure: cataract in right eye  Diagnosis/reason for consult: perioperative risk evaluation for right cataract removal, IOL placement    The proposed surgical procedure is considered LOW risk.    REVISED CARDIAC RISK INDEX  The patient has the following serious cardiovascular risks for perioperative complications such as (MI, PE, VFib and 3  AV Block):  No serious cardiac risks  INTERPRETATION: 0 risks: Class I (very low risk - 0.4% complication rate)    The patient has the following additional risks for perioperative complications:  The 10-year ASCVD risk score (Rashaad KWONG Jr., et al., 2013) is: 6.8%    Values used to calculate the score:      Age: 64 years      Sex: Female      Is Non- : No      Diabetic: No      Tobacco smoker: No      Systolic Blood Pressure: 136 mmHg      Is BP treated: Yes      HDL Cholesterol: 39 mg/dL      Total Cholesterol: 176 mg/dL  Morbid obesity       ICD-10-CM    1. Preop general physical exam Z01.818    2. 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 Hypertension goal BP (blood pressure) < 140/90 I10    4. Morbid obesity (H) E66.01    5. 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 Hormone replacement therapy (HRT) Z79.890    7. Routine general medical examination at a health care facility Z00.00        RECOMMENDATIONS:       Cardiovascular Risk  Performs 4 METs exercise without symptoms (Climb a flight of stairs) .       Pulmonary Risk  Incentive spirometry post op  Respiratory Therapy (Respiratory Care IP Consult)  post op  NG tube decompression if abdominal distension or significant vomiting       --Patient is to take all scheduled medications on the day of surgery EXCEPT for modifications listed below.    Anticoagulant or Antiplatelet Medication Use  NSAIDS: Ibuprofen (Motrin):         Stop one day prior to surgery        ACE Inhibitor or Angiotensin Receptor Blocker (ARB) Use  Ace inhibitor or Angiotensin Receptor Blocker (ARB) and should HOLD this medication for the 24 hours prior to surgery.      APPROVAL GIVEN to proceed with proposed procedure, without further diagnostic evaluation       Signed Electronically by: Omar Hand MD, MD    Copy of this evaluation report is provided to requesting physician.    [facility] Preop Guidelines    Revised Cardiac Risk Index

## 2019-10-03 ENCOUNTER — OFFICE VISIT (OUTPATIENT)
Dept: FAMILY MEDICINE | Facility: OTHER | Age: 64
End: 2019-10-03
Payer: COMMERCIAL

## 2019-10-03 VITALS
RESPIRATION RATE: 16 BRPM | TEMPERATURE: 97.1 F | HEART RATE: 68 BPM | HEIGHT: 63 IN | DIASTOLIC BLOOD PRESSURE: 78 MMHG | BODY MASS INDEX: 41.11 KG/M2 | WEIGHT: 232 LBS | OXYGEN SATURATION: 97 % | SYSTOLIC BLOOD PRESSURE: 136 MMHG

## 2019-10-03 DIAGNOSIS — H26.101 TRAUMATIC CATARACT OF RIGHT EYE, UNSPECIFIED TRAUMATIC CATARACT TYPE: ICD-10-CM

## 2019-10-03 DIAGNOSIS — Z12.11 SPECIAL SCREENING FOR MALIGNANT NEOPLASMS, COLON: ICD-10-CM

## 2019-10-03 DIAGNOSIS — Z01.818 PREOP GENERAL PHYSICAL EXAM: Primary | ICD-10-CM

## 2019-10-03 DIAGNOSIS — Z00.00 ROUTINE GENERAL MEDICAL EXAMINATION AT A HEALTH CARE FACILITY: ICD-10-CM

## 2019-10-03 DIAGNOSIS — E66.01 MORBID OBESITY (H): ICD-10-CM

## 2019-10-03 DIAGNOSIS — D23.9 CLEAR CELL HIDRADENOMA: ICD-10-CM

## 2019-10-03 DIAGNOSIS — I10 HYPERTENSION GOAL BP (BLOOD PRESSURE) < 140/90: ICD-10-CM

## 2019-10-03 DIAGNOSIS — Z79.890 HORMONE REPLACEMENT THERAPY (HRT): ICD-10-CM

## 2019-10-03 LAB
ANION GAP SERPL CALCULATED.3IONS-SCNC: 10 MMOL/L (ref 3–14)
BUN SERPL-MCNC: 11 MG/DL (ref 7–30)
CALCIUM SERPL-MCNC: 9.2 MG/DL (ref 8.5–10.1)
CHLORIDE SERPL-SCNC: 105 MMOL/L (ref 94–109)
CO2 SERPL-SCNC: 26 MMOL/L (ref 20–32)
CREAT SERPL-MCNC: 0.75 MG/DL (ref 0.52–1.04)
ERYTHROCYTE [DISTWIDTH] IN BLOOD BY AUTOMATED COUNT: 13.4 % (ref 10–15)
GFR SERPL CREATININE-BSD FRML MDRD: 85 ML/MIN/{1.73_M2}
GLUCOSE SERPL-MCNC: 98 MG/DL (ref 70–99)
HCT VFR BLD AUTO: 40.2 % (ref 35–47)
HGB BLD-MCNC: 13.1 G/DL (ref 11.7–15.7)
MCH RBC QN AUTO: 30.3 PG (ref 26.5–33)
MCHC RBC AUTO-ENTMCNC: 32.6 G/DL (ref 31.5–36.5)
MCV RBC AUTO: 93 FL (ref 78–100)
PLATELET # BLD AUTO: 356 10E9/L (ref 150–450)
POTASSIUM SERPL-SCNC: 4.3 MMOL/L (ref 3.4–5.3)
RBC # BLD AUTO: 4.32 10E12/L (ref 3.8–5.2)
SODIUM SERPL-SCNC: 141 MMOL/L (ref 133–144)
WBC # BLD AUTO: 8.4 10E9/L (ref 4–11)

## 2019-10-03 PROCEDURE — 36415 COLL VENOUS BLD VENIPUNCTURE: CPT | Performed by: FAMILY MEDICINE

## 2019-10-03 PROCEDURE — 99396 PREV VISIT EST AGE 40-64: CPT | Performed by: FAMILY MEDICINE

## 2019-10-03 PROCEDURE — 85027 COMPLETE CBC AUTOMATED: CPT | Performed by: FAMILY MEDICINE

## 2019-10-03 PROCEDURE — 80048 BASIC METABOLIC PNL TOTAL CA: CPT | Performed by: FAMILY MEDICINE

## 2019-10-03 PROCEDURE — 99213 OFFICE O/P EST LOW 20 MIN: CPT | Mod: 25 | Performed by: FAMILY MEDICINE

## 2019-10-03 ASSESSMENT — ENCOUNTER SYMPTOMS
CONSTIPATION: 0
SORE THROAT: 0
COUGH: 0
DYSURIA: 0
NERVOUS/ANXIOUS: 0
BREAST MASS: 0
SHORTNESS OF BREATH: 0
HEMATOCHEZIA: 0
JOINT SWELLING: 0
NAUSEA: 0
MYALGIAS: 0
ARTHRALGIAS: 0
DIARRHEA: 0
ABDOMINAL PAIN: 0
CHILLS: 0
WEAKNESS: 0
FEVER: 0
FREQUENCY: 1
DIZZINESS: 0
HEMATURIA: 0
HEARTBURN: 0
EYE PAIN: 0
HEADACHES: 0
PARESTHESIAS: 0
PALPITATIONS: 0

## 2019-10-03 ASSESSMENT — PAIN SCALES - GENERAL: PAINLEVEL: NO PAIN (0)

## 2019-10-03 ASSESSMENT — MIFFLIN-ST. JEOR: SCORE: 1581.35

## 2019-10-03 NOTE — LETTER
October 4, 2019      Addis Ferreira  [address] [address] [place] [id number]        Dear ,    All of your labs are normal for you.     Resulted Orders   Basic metabolic panel   Result Value Ref Range    Sodium 141 133 - 144 mmol/L    Potassium 4.3 3.4 - 5.3 mmol/L    Chloride 105 94 - 109 mmol/L    Carbon Dioxide 26 20 - 32 mmol/L    Anion Gap 10 3 - 14 mmol/L    Glucose 98 70 - 99 mg/dL    Urea Nitrogen 11 7 - 30 mg/dL    Creatinine 0.75 0.52 - 1.04 mg/dL    GFR Estimate 85 >60 mL/min/[1.73_m2]      Comment:      Non  GFR Calc  Starting 12/18/2018, serum creatinine based estimated GFR (eGFR) will be   calculated using the Chronic Kidney Disease Epidemiology Collaboration   (CKD-EPI) equation.      GFR Estimate If Black >90 >60 mL/min/[1.73_m2]      Comment:       GFR Calc  Starting 12/18/2018, serum creatinine based estimated GFR (eGFR) will be   calculated using the Chronic Kidney Disease Epidemiology Collaboration   (CKD-EPI) equation.      Calcium 9.2 8.5 - 10.1 mg/dL   CBC with platelets   Result Value Ref Range    WBC 8.4 4.0 - 11.0 10e9/L    RBC Count 4.32 3.8 - 5.2 10e12/L    Hemoglobin 13.1 11.7 - 15.7 g/dL    Hematocrit 40.2 35.0 - 47.0 %    MCV 93 78 - 100 fl    MCH 30.3 26.5 - 33.0 pg    MCHC 32.6 31.5 - 36.5 g/dL    RDW 13.4 10.0 - 15.0 %    Platelet Count 356 150 - 450 10e9/L       If you have any questions or concerns, please call the clinic at the number listed above.       Sincerely,        Omar Hand MD, MD

## 2019-12-17 ENCOUNTER — TRANSFERRED RECORDS (OUTPATIENT)
Dept: HEALTH INFORMATION MANAGEMENT | Facility: CLINIC | Age: 64
End: 2019-12-17

## 2020-01-13 DIAGNOSIS — I10 HYPERTENSION GOAL BP (BLOOD PRESSURE) < 140/90: ICD-10-CM

## 2020-01-13 RX ORDER — HYDROCHLOROTHIAZIDE 25 MG/1
TABLET ORAL
Qty: 90 TABLET | Refills: 2 | Status: SHIPPED | OUTPATIENT
Start: 2020-01-13 | End: 2020-10-06

## 2020-01-13 NOTE — TELEPHONE ENCOUNTER
Prescription approved per [facility] Refill Protocol.  Hollis Jean, RN, BSN

## 2020-03-20 DIAGNOSIS — I10 HYPERTENSION GOAL BP (BLOOD PRESSURE) < 140/90: ICD-10-CM

## 2020-03-20 RX ORDER — LISINOPRIL 40 MG/1
TABLET ORAL
Qty: 90 TABLET | Refills: 2 | Status: SHIPPED | OUTPATIENT
Start: 2020-03-20 | End: 2020-12-03

## 2020-03-20 NOTE — TELEPHONE ENCOUNTER
"Requested Prescriptions   Pending Prescriptions Disp Refills     lisinopril (ZESTRIL) 40 MG tablet [Pharmacy Med Name: LISINOPRIL 40MG TABS] 90 tablet 3     Sig: TAKE ONE TABLET BY MOUTH DAILY       ACE Inhibitors (Including Combos) Protocol Passed - 3/20/2020  3:04 PM        Passed - Blood pressure under 140/90 in past 12 months     BP Readings from Last 3 Encounters:   10/03/19 136/78   04/04/19 138/70   01/17/19 172/72                 Passed - Recent (12 mo) or future (30 days) visit within the authorizing provider's specialty     Patient has had an office visit with the authorizing provider or a provider within the authorizing providers department within the previous 12 mos or has a future within next 30 days. See \"Patient Info\" tab in inbasket, or \"Choose Columns\" in Meds & Orders section of the refill encounter.              Passed - Medication is active on med list        Passed - Patient is age 18 or older        Passed - No active pregnancy on record        Passed - Normal serum creatinine on file in past 12 months     Recent Labs   Lab Test 10/03/19  1017   CR 0.75       Ok to refill medication if creatinine is low          Passed - Normal serum potassium on file in past 12 months     Recent Labs   Lab Test 10/03/19  1017   POTASSIUM 4.3             Passed - No positive pregnancy test within past 12 months           Prescription approved per [facility] Refill Protocol.    Cabrera Vernon, RN, BSN    "

## 2020-10-06 DIAGNOSIS — I10 HYPERTENSION GOAL BP (BLOOD PRESSURE) < 140/90: ICD-10-CM

## 2020-10-06 RX ORDER — HYDROCHLOROTHIAZIDE 25 MG/1
TABLET ORAL
Qty: 90 TABLET | Refills: 0 | Status: SHIPPED | OUTPATIENT
Start: 2020-10-06 | End: 2020-12-03

## 2020-10-07 NOTE — TELEPHONE ENCOUNTER
Pending Prescriptions:                       Disp   Refills    hydrochlorothiazide (HYDRODIURIL) 25 MG t*90 tab*0            Sig: TAKE ONE TABLET BY MOUTH ONCE DAILY    Medication is being filled for 1 time leo refill only due to:  Patient is due for med check with labs    Please call and help schedule.  Thank you!

## 2020-11-16 ENCOUNTER — TRANSFERRED RECORDS (OUTPATIENT)
Dept: HEALTH INFORMATION MANAGEMENT | Facility: CLINIC | Age: 65
End: 2020-11-16

## 2020-11-16 LAB
HPV ABSTRACT: NORMAL
PAP-ABSTRACT: NORMAL

## 2020-11-27 NOTE — PROGRESS NOTES
Subjective     Addis Ferreira is a 65 year old female who presents to clinic today for the following health issues:    History of Present Illness       Hypertension: She presents for follow up of hypertension.  She does not check blood pressure  regularly outside of the clinic. Outside blood pressures have been over 140/90. She does not follow a low salt diet.     She eats 2-3 servings of fruits and vegetables daily.She consumes 0 sweetened beverage(s) daily.She exercises with enough effort to increase her heart rate 10 to 19 minutes per day.  She exercises with enough effort to increase her heart rate 3 or less days per week.   She is taking medications regularly.     Pt reports some mildly elevated pressures recently.  But OK today.  Notes occasional cough when she's dehydrated.  Doesn't really bother her.  No other side effects.      Still using her estrogen cream, just had her pap done by [facility] in [place].  That was normal.            Hypertension Follow-up      Do you check your blood pressure regularly outside of the clinic? No     Are you following a low salt diet? No    Are your blood pressures ever more than 140 on the top number (systolic) OR more   than 90 on the bottom number (diastolic), for example 140/90? Yes        Review of Systems   Constitutional, HEENT, cardiovascular, pulmonary, gi and gu systems are negative, except as otherwise noted.      Objective    /78   Pulse 89   Temp 98  F (36.7  C) (Temporal)   Resp 16   Wt 107 kg (236 lb)   LMP 06/30/2003   SpO2 98%   BMI 41.82 kg/m    Body mass index is 41.82 kg/m .  Physical Exam   GENERAL: healthy, alert and no distress  NECK: no adenopathy, no asymmetry, masses, or scars and thyroid normal to palpation  RESP: lungs clear to auscultation - no rales, rhonchi or wheezes  CV: regular rate and rhythm, normal S1 S2, no S3 or S4, no murmur, click or rub, no peripheral edema and peripheral pulses strong  ABDOMEN: soft, nontender, no  hepatosplenomegaly, no masses and bowel sounds normal  MS: no gross musculoskeletal defects noted, no edema    No results found for any visits on 12/03/20.  Office Visit on 10/03/2019   Component Date Value Ref Range Status     Sodium 10/03/2019 141  133 - 144 mmol/L Final     Potassium 10/03/2019 4.3  3.4 - 5.3 mmol/L Final     Chloride 10/03/2019 105  94 - 109 mmol/L Final     Carbon Dioxide 10/03/2019 26  20 - 32 mmol/L Final     Anion Gap 10/03/2019 10  3 - 14 mmol/L Final     Glucose 10/03/2019 98  70 - 99 mg/dL Final     Urea Nitrogen 10/03/2019 11  7 - 30 mg/dL Final     Creatinine 10/03/2019 0.75  0.52 - 1.04 mg/dL Final     GFR Estimate 10/03/2019 85  >60 mL/min/[1.73_m2] Final    Comment: Non  GFR Calc  Starting 12/18/2018, serum creatinine based estimated GFR (eGFR) will be   calculated using the Chronic Kidney Disease Epidemiology Collaboration   (CKD-EPI) equation.       GFR Estimate If Black 10/03/2019 >90  >60 mL/min/[1.73_m2] Final    Comment:  GFR Calc  Starting 12/18/2018, serum creatinine based estimated GFR (eGFR) will be   calculated using the Chronic Kidney Disease Epidemiology Collaboration   (CKD-EPI) equation.       Calcium 10/03/2019 9.2  8.5 - 10.1 mg/dL Final     WBC 10/03/2019 8.4  4.0 - 11.0 10e9/L Final     RBC Count 10/03/2019 4.32  3.8 - 5.2 10e12/L Final     Hemoglobin 10/03/2019 13.1  11.7 - 15.7 g/dL Final     Hematocrit 10/03/2019 40.2  35.0 - 47.0 % Final     MCV 10/03/2019 93  78 - 100 fl Final     MCH 10/03/2019 30.3  26.5 - 33.0 pg Final     MCHC 10/03/2019 32.6  31.5 - 36.5 g/dL Final     RDW 10/03/2019 13.4  10.0 - 15.0 % Final     Platelet Count 10/03/2019 356  150 - 450 10e9/L Final           Assessment & Plan       ICD-10-CM    1. Hypertension goal BP (blood pressure) < 140/90  I10 Comprehensive metabolic panel (BMP + Alb, Alk Phos, ALT, AST, Total. Bili, TP)     hydrochlorothiazide (HYDRODIURIL) 25 MG tablet     lisinopril (ZESTRIL) 40 MG  tablet   2. Morbid obesity (H)  E66.01    3. Hormone replacement therapy (HRT)  Z79.890    4. Screen for colon cancer  Z12.11 Fecal colorectal cancer screen FIT - Future (S+30)   5. Seborrheic keratoses  L82.1    6. Need for prophylactic vaccination and inoculation against influenza  Z23 INFLUENZA QUAD, RECOMBINANT, P-FREE (RIV4) (FLUBLOCK) [77486]      1.  Clinically controlled.  Will continue current regimen.  Encourage lifestyle modifications to help with this.  2.  Encourage lifestyle modifications.  We will continue to follow and assist as able.  3.  Primarily managed by her OB/GYN.  Updated her health maintenance items as able.  4.  Screening ordered.  5.  Discussed the nature of these.  Discussed options for treatment if clinically warranted.  6.  Immunized.    Portions of this note were completed using Dragon dictation software.  Although reviewed, there may be typographical and other inadvertent errors that remain.               Patient Instructions   Thank you for visiting [facility]    If any of your spots get bothersome, we can freeze or remove them.      We'll let you know your lab results as soon as we can.     Keep working on eating healthy and staying active.      Please return your stool kit in the next few weeks.    Get your mammogram done.    Contact us or return if questions or concerns.     Have a nice day!    Dr. Hand     No follow-ups on file.      If you need medication refills, please contact your pharmacy 3 days before your prescriptions runs out or download the [facility] aubree for your smart phone.   If you are out of refills, your pharmacy will contact contact the clinic.                                     [facility] assistance [phone]                       Return in about 1 year (around 12/3/2021) for Physical Exam.    Omar Hand MD, MD  [facility]

## 2020-12-03 ENCOUNTER — OFFICE VISIT (OUTPATIENT)
Dept: FAMILY MEDICINE | Facility: OTHER | Age: 65
End: 2020-12-03
Payer: COMMERCIAL

## 2020-12-03 VITALS
TEMPERATURE: 98 F | BODY MASS INDEX: 41.82 KG/M2 | HEART RATE: 89 BPM | WEIGHT: 236 LBS | RESPIRATION RATE: 16 BRPM | SYSTOLIC BLOOD PRESSURE: 138 MMHG | OXYGEN SATURATION: 98 % | DIASTOLIC BLOOD PRESSURE: 78 MMHG

## 2020-12-03 DIAGNOSIS — Z23 NEED FOR PROPHYLACTIC VACCINATION AND INOCULATION AGAINST INFLUENZA: ICD-10-CM

## 2020-12-03 DIAGNOSIS — E66.01 MORBID OBESITY (H): ICD-10-CM

## 2020-12-03 DIAGNOSIS — Z79.890 HORMONE REPLACEMENT THERAPY (HRT): ICD-10-CM

## 2020-12-03 DIAGNOSIS — Z12.11 SCREEN FOR COLON CANCER: ICD-10-CM

## 2020-12-03 DIAGNOSIS — L82.1 SEBORRHEIC KERATOSES: ICD-10-CM

## 2020-12-03 DIAGNOSIS — I10 HYPERTENSION GOAL BP (BLOOD PRESSURE) < 140/90: Primary | ICD-10-CM

## 2020-12-03 LAB
ALBUMIN SERPL-MCNC: 3.8 G/DL (ref 3.4–5)
ALP SERPL-CCNC: 80 U/L (ref 40–150)
ALT SERPL W P-5'-P-CCNC: 22 U/L (ref 0–50)
ANION GAP SERPL CALCULATED.3IONS-SCNC: 6 MMOL/L (ref 3–14)
AST SERPL W P-5'-P-CCNC: 10 U/L (ref 0–45)
BILIRUB SERPL-MCNC: 0.2 MG/DL (ref 0.2–1.3)
BUN SERPL-MCNC: 13 MG/DL (ref 7–30)
CALCIUM SERPL-MCNC: 9.6 MG/DL (ref 8.5–10.1)
CHLORIDE SERPL-SCNC: 103 MMOL/L (ref 94–109)
CO2 SERPL-SCNC: 28 MMOL/L (ref 20–32)
CREAT SERPL-MCNC: 0.8 MG/DL (ref 0.52–1.04)
GFR SERPL CREATININE-BSD FRML MDRD: 78 ML/MIN/{1.73_M2}
GLUCOSE SERPL-MCNC: 87 MG/DL (ref 70–99)
POTASSIUM SERPL-SCNC: 3.5 MMOL/L (ref 3.4–5.3)
PROT SERPL-MCNC: 7.9 G/DL (ref 6.8–8.8)
SODIUM SERPL-SCNC: 137 MMOL/L (ref 133–144)

## 2020-12-03 PROCEDURE — 36415 COLL VENOUS BLD VENIPUNCTURE: CPT | Performed by: FAMILY MEDICINE

## 2020-12-03 PROCEDURE — 99214 OFFICE O/P EST MOD 30 MIN: CPT | Performed by: FAMILY MEDICINE

## 2020-12-03 PROCEDURE — 80053 COMPREHEN METABOLIC PANEL: CPT | Performed by: FAMILY MEDICINE

## 2020-12-03 RX ORDER — HYDROCHLOROTHIAZIDE 25 MG/1
25 TABLET ORAL DAILY
Qty: 90 TABLET | Refills: 3 | Status: SHIPPED | OUTPATIENT
Start: 2020-12-03 | End: 2022-01-01

## 2020-12-03 RX ORDER — LISINOPRIL 40 MG/1
40 TABLET ORAL DAILY
Qty: 90 TABLET | Refills: 3 | Status: SHIPPED | OUTPATIENT
Start: 2020-12-03 | End: 2022-01-01

## 2020-12-03 SDOH — HEALTH STABILITY: MENTAL HEALTH: HOW MANY STANDARD DRINKS CONTAINING ALCOHOL DO YOU HAVE ON A TYPICAL DAY?: 1 OR 2

## 2020-12-03 SDOH — HEALTH STABILITY: MENTAL HEALTH: HOW OFTEN DO YOU HAVE 6 OR MORE DRINKS ON ONE OCCASION?: NOT ASKED

## 2020-12-03 SDOH — HEALTH STABILITY: MENTAL HEALTH: HOW OFTEN DO YOU HAVE A DRINK CONTAINING ALCOHOL?: MONTHLY OR LESS

## 2020-12-03 ASSESSMENT — ANXIETY QUESTIONNAIRES
GAD7 TOTAL SCORE: 1
GAD7 TOTAL SCORE: 1
5. BEING SO RESTLESS THAT IT IS HARD TO SIT STILL: NOT AT ALL
1. FEELING NERVOUS, ANXIOUS, OR ON EDGE: SEVERAL DAYS
4. TROUBLE RELAXING: NOT AT ALL
GAD7 TOTAL SCORE: 1
2. NOT BEING ABLE TO STOP OR CONTROL WORRYING: NOT AT ALL
6. BECOMING EASILY ANNOYED OR IRRITABLE: NOT AT ALL
3. WORRYING TOO MUCH ABOUT DIFFERENT THINGS: NOT AT ALL
7. FEELING AFRAID AS IF SOMETHING AWFUL MIGHT HAPPEN: NOT AT ALL
7. FEELING AFRAID AS IF SOMETHING AWFUL MIGHT HAPPEN: NOT AT ALL

## 2020-12-03 ASSESSMENT — PATIENT HEALTH QUESTIONNAIRE - PHQ9
10. IF YOU CHECKED OFF ANY PROBLEMS, HOW DIFFICULT HAVE THESE PROBLEMS MADE IT FOR YOU TO DO YOUR WORK, TAKE CARE OF THINGS AT HOME, OR GET ALONG WITH OTHER PEOPLE: NOT DIFFICULT AT ALL
SUM OF ALL RESPONSES TO PHQ QUESTIONS 1-9: 1
SUM OF ALL RESPONSES TO PHQ QUESTIONS 1-9: 1

## 2020-12-03 NOTE — PATIENT INSTRUCTIONS
Thank you for visiting [facility]    If any of your spots get bothersome, we can freeze or remove them.      We'll let you know your lab results as soon as we can.     Keep working on eating healthy and staying active.      Please return your stool kit in the next few weeks.    Get your mammogram done.    Contact us or return if questions or concerns.     Have a nice day!    Dr. Hand     No follow-ups on file.      If you need medication refills, please contact your pharmacy 3 days before your prescriptions runs out or download the [facility] aubree for your smart phone.   If you are out of refills, your pharmacy will contact contact the clinic.                                     [facility] assistance [phone]

## 2020-12-04 ENCOUNTER — TELEPHONE (OUTPATIENT)
Dept: FAMILY MEDICINE | Facility: OTHER | Age: 65
End: 2020-12-04

## 2020-12-04 ASSESSMENT — ANXIETY QUESTIONNAIRES: GAD7 TOTAL SCORE: 1

## 2020-12-04 ASSESSMENT — PATIENT HEALTH QUESTIONNAIRE - PHQ9: SUM OF ALL RESPONSES TO PHQ QUESTIONS 1-9: 1

## 2020-12-04 NOTE — TELEPHONE ENCOUNTER
Left message for patient to return call.      All of your labs were normal for you.  Potassium is low normal.  Consider increasing your potassium intake.     Have a nice day!     Dr. Hand

## 2020-12-04 NOTE — RESULT ENCOUNTER NOTE
All of your labs were normal for you.  Potassium is low normal.  Consider increasing your potassium intake.    Have a nice day!    Dr. Hand

## 2021-10-28 ENCOUNTER — TELEPHONE (OUTPATIENT)
Dept: FAMILY MEDICINE | Facility: OTHER | Age: 66
End: 2021-10-28

## 2021-10-28 NOTE — TELEPHONE ENCOUNTER
Please assist with scheduling a physical, mammogram and colon cancer screens.   Patient Quality Outreach Summary      Summary:    Patient is due/failing the following:   Colonoscopy, Breast Cancer Screening - Mammogram and Physical     Type of outreach:    Phone, left message for patient/parent to call back.    Questions for provider review:    None                                                                                                                    Chica Eaton CMA       Chart routed to Care Team.

## 2022-01-01 DIAGNOSIS — I10 HYPERTENSION GOAL BP (BLOOD PRESSURE) < 140/90: ICD-10-CM

## 2022-01-01 RX ORDER — HYDROCHLOROTHIAZIDE 25 MG/1
25 TABLET ORAL DAILY
Qty: 30 TABLET | Refills: 0 | Status: SHIPPED | OUTPATIENT
Start: 2022-01-01 | End: 2022-01-03

## 2022-01-01 RX ORDER — LISINOPRIL 40 MG/1
40 TABLET ORAL DAILY
Qty: 30 TABLET | Refills: 0 | Status: SHIPPED | OUTPATIENT
Start: 2022-01-01 | End: 2022-01-03

## 2022-01-01 NOTE — TELEPHONE ENCOUNTER
30 day supply approved per Dr. Michelle. Please advise on further refills before appointment on 2/14/21 or if patient can be seen sooner.    Elizabeth Wrener RN  [facility]

## 2022-01-01 NOTE — TELEPHONE ENCOUNTER
"On call provider, Dr. Pramod Michelle, paged at 10:50 AM.    Per Dr. Michelle a verbal order is received for a 30 day supply to be sent to pharmacy. Message will be sent to Dr. Hand for further refill or to see if patient can be seen sooner in clinic.    Elizabeth Werner RN  [facility]        Routing refill request to provider for review/approval because:  Labs not current:  Last labs 12/3/20  Patient needs to be seen because it has been more than 1 year since last office visit.    Last Written Prescription Date:  12/3/20  Last Fill Quantity: 90,  # refills: 3   Last office visit provider:  12/3/20     Requested Prescriptions   Pending Prescriptions Disp Refills     hydrochlorothiazide (HYDRODIURIL) 25 MG tablet 90 tablet 3     Sig: Take 1 tablet (25 mg) by mouth daily       Diuretics (Including Combos) Protocol Failed - 1/1/2022 10:40 AM        Failed - Blood pressure under 140/90 in past 12 months     BP Readings from Last 3 Encounters:   12/03/20 138/78   10/03/19 136/78   04/04/19 138/70                 Failed - Recent (12 mo) or future (30 days) visit within the authorizing provider's specialty     Patient has had an office visit with the authorizing provider or a provider within the authorizing providers department within the previous 12 mos or has a future within next 30 days. See \"Patient Info\" tab in inbasket, or \"Choose Columns\" in Meds & Orders section of the refill encounter.              Failed - Normal serum creatinine on file in past 12 months     Recent Labs   Lab Test 12/03/20  1402   CR 0.80              Failed - Normal serum potassium on file in past 12 months     Recent Labs   Lab Test 12/03/20  1402   POTASSIUM 3.5                    Failed - Normal serum sodium on file in past 12 months     Recent Labs   Lab Test 12/03/20  1402                 Passed - Medication is active on med list        Passed - Patient is age 18 or older        Passed - No active pregancy on record " "       Passed - No positive pregnancy test in past 12 months           lisinopril (ZESTRIL) 40 MG tablet 90 tablet 3     Sig: Take 1 tablet (40 mg) by mouth daily       ACE Inhibitors (Including Combos) Protocol Failed - 1/1/2022 10:40 AM        Failed - Blood pressure under 140/90 in past 12 months     BP Readings from Last 3 Encounters:   12/03/20 138/78   10/03/19 136/78   04/04/19 138/70                 Failed - Recent (12 mo) or future (30 days) visit within the authorizing provider's specialty     Patient has had an office visit with the authorizing provider or a provider within the authorizing providers department within the previous 12 mos or has a future within next 30 days. See \"Patient Info\" tab in inbasket, or \"Choose Columns\" in Meds & Orders section of the refill encounter.              Failed - Normal serum creatinine on file in past 12 months     Recent Labs   Lab Test 12/03/20  1402   CR 0.80       Ok to refill medication if creatinine is low          Failed - Normal serum potassium on file in past 12 months     Recent Labs   Lab Test 12/03/20  1402   POTASSIUM 3.5             Passed - Medication is active on med list        Passed - Patient is age 18 or older        Passed - No active pregnancy on record        Passed - No positive pregnancy test within past 12 months             Elizabeth Werner RN 01/01/22 10:49 AM  "

## 2022-01-03 RX ORDER — LISINOPRIL 40 MG/1
40 TABLET ORAL DAILY
Qty: 30 TABLET | Refills: 0 | Status: SHIPPED | OUTPATIENT
Start: 2022-01-03 | End: 2022-01-18

## 2022-01-03 RX ORDER — HYDROCHLOROTHIAZIDE 25 MG/1
25 TABLET ORAL DAILY
Qty: 30 TABLET | Refills: 0 | Status: SHIPPED | OUTPATIENT
Start: 2022-01-03 | End: 2022-01-18

## 2022-01-18 RX ORDER — HYDROCHLOROTHIAZIDE 25 MG/1
25 TABLET ORAL DAILY
Qty: 15 TABLET | Refills: 0 | Status: SHIPPED | OUTPATIENT
Start: 2022-01-18 | End: 2022-02-14

## 2022-01-18 RX ORDER — LISINOPRIL 40 MG/1
40 TABLET ORAL DAILY
Qty: 15 TABLET | Refills: 0 | Status: SHIPPED | OUTPATIENT
Start: 2022-01-18 | End: 2022-02-14

## 2022-01-27 DIAGNOSIS — I10 HYPERTENSION GOAL BP (BLOOD PRESSURE) < 140/90: ICD-10-CM

## 2022-01-27 NOTE — TELEPHONE ENCOUNTER
Patient calling in regards to her RXs. States she was given enough to get her to her appointment scheduled 1/3 but than they called and cancelled her appointment and needed her to reschedule. She was not given enough medication.     Patient is scheduled to see PCP on 2/14 but will be out of medication on 2/1. Will need two weeks worth to get her to scheduled visit.      Medications and pharmacy pended      Routing to PCP to advise.    AMOS Sanchez/[facility]  January 27, 2022

## 2022-01-29 RX ORDER — LISINOPRIL 40 MG/1
40 TABLET ORAL DAILY
Qty: 15 TABLET | Refills: 0 | OUTPATIENT
Start: 2022-01-29

## 2022-01-29 RX ORDER — HYDROCHLOROTHIAZIDE 25 MG/1
25 TABLET ORAL DAILY
Qty: 15 TABLET | Refills: 0 | OUTPATIENT
Start: 2022-01-29

## 2022-02-01 NOTE — TELEPHONE ENCOUNTER
Called to inform patient, but was not home and was unable to reach on cell. Please inform patient of the message below.     RN called to verify with pharmacy.   Patient should have medication available at pharmacy. They are getting it ready for .     CHIQUI AllredN, RN, N  [place]  February 1, 2022

## 2022-02-12 ENCOUNTER — HEALTH MAINTENANCE LETTER (OUTPATIENT)
Age: 67
End: 2022-02-12

## 2022-02-14 ENCOUNTER — OFFICE VISIT (OUTPATIENT)
Dept: FAMILY MEDICINE | Facility: OTHER | Age: 67
End: 2022-02-14
Payer: COMMERCIAL

## 2022-02-14 VITALS
HEART RATE: 65 BPM | RESPIRATION RATE: 20 BRPM | SYSTOLIC BLOOD PRESSURE: 132 MMHG | TEMPERATURE: 98.4 F | DIASTOLIC BLOOD PRESSURE: 80 MMHG | HEIGHT: 64 IN | BODY MASS INDEX: 39.78 KG/M2 | WEIGHT: 233 LBS | OXYGEN SATURATION: 97 %

## 2022-02-14 DIAGNOSIS — I10 HYPERTENSION GOAL BP (BLOOD PRESSURE) < 140/90: Primary | ICD-10-CM

## 2022-02-14 DIAGNOSIS — Z79.890 HORMONE REPLACEMENT THERAPY (HRT): ICD-10-CM

## 2022-02-14 DIAGNOSIS — E66.01 MORBID OBESITY (H): ICD-10-CM

## 2022-02-14 LAB
ALBUMIN SERPL-MCNC: 3.3 G/DL (ref 3.4–5)
ALP SERPL-CCNC: 79 U/L (ref 40–150)
ALT SERPL W P-5'-P-CCNC: 31 U/L (ref 0–50)
ANION GAP SERPL CALCULATED.3IONS-SCNC: 6 MMOL/L (ref 3–14)
AST SERPL W P-5'-P-CCNC: 15 U/L (ref 0–45)
BILIRUB SERPL-MCNC: 0.3 MG/DL (ref 0.2–1.3)
BUN SERPL-MCNC: 15 MG/DL (ref 7–30)
CALCIUM SERPL-MCNC: 9.5 MG/DL (ref 8.5–10.1)
CHLORIDE BLD-SCNC: 104 MMOL/L (ref 94–109)
CO2 SERPL-SCNC: 28 MMOL/L (ref 20–32)
CREAT SERPL-MCNC: 0.78 MG/DL (ref 0.52–1.04)
ERYTHROCYTE [DISTWIDTH] IN BLOOD BY AUTOMATED COUNT: 13.9 % (ref 10–15)
GFR SERPL CREATININE-BSD FRML MDRD: 84 ML/MIN/1.73M2
GLUCOSE BLD-MCNC: 98 MG/DL (ref 70–99)
HCT VFR BLD AUTO: 40.9 % (ref 35–47)
HGB BLD-MCNC: 13.3 G/DL (ref 11.7–15.7)
MCH RBC QN AUTO: 30 PG (ref 26.5–33)
MCHC RBC AUTO-ENTMCNC: 32.5 G/DL (ref 31.5–36.5)
MCV RBC AUTO: 92 FL (ref 78–100)
PLATELET # BLD AUTO: 369 10E3/UL (ref 150–450)
POTASSIUM BLD-SCNC: 3.8 MMOL/L (ref 3.4–5.3)
PROT SERPL-MCNC: 7.7 G/DL (ref 6.8–8.8)
RBC # BLD AUTO: 4.44 10E6/UL (ref 3.8–5.2)
SODIUM SERPL-SCNC: 138 MMOL/L (ref 133–144)
WBC # BLD AUTO: 10.1 10E3/UL (ref 4–11)

## 2022-02-14 PROCEDURE — 80053 COMPREHEN METABOLIC PANEL: CPT | Performed by: FAMILY MEDICINE

## 2022-02-14 PROCEDURE — 99214 OFFICE O/P EST MOD 30 MIN: CPT | Performed by: FAMILY MEDICINE

## 2022-02-14 PROCEDURE — 85027 COMPLETE CBC AUTOMATED: CPT | Performed by: FAMILY MEDICINE

## 2022-02-14 PROCEDURE — 36415 COLL VENOUS BLD VENIPUNCTURE: CPT | Performed by: FAMILY MEDICINE

## 2022-02-14 RX ORDER — ESTRADIOL 0.1 MG/D
FILM, EXTENDED RELEASE TRANSDERMAL
COMMUNITY
Start: 2021-11-30 | End: 2024-01-31

## 2022-02-14 RX ORDER — PROGESTERONE 200 MG/1
200 CAPSULE ORAL
COMMUNITY
Start: 2021-11-30 | End: 2022-02-14

## 2022-02-14 RX ORDER — PROGESTERONE 200 MG/1
200 CAPSULE ORAL DAILY
COMMUNITY
Start: 2022-02-14 | End: 2024-01-31

## 2022-02-14 RX ORDER — LISINOPRIL 40 MG/1
40 TABLET ORAL DAILY
Qty: 90 TABLET | Refills: 3 | Status: SHIPPED | OUTPATIENT
Start: 2022-02-14 | End: 2023-02-02

## 2022-02-14 RX ORDER — FAMOTIDINE 20 MG
TABLET ORAL
COMMUNITY

## 2022-02-14 RX ORDER — SODIUM PHOSPHATE,MONO-DIBASIC 19G-7G/118
1 ENEMA (ML) RECTAL DAILY
COMMUNITY
End: 2024-01-31

## 2022-02-14 RX ORDER — HYDROCHLOROTHIAZIDE 25 MG/1
25 TABLET ORAL DAILY
Qty: 90 TABLET | Refills: 3 | Status: SHIPPED | OUTPATIENT
Start: 2022-02-14 | End: 2023-02-02

## 2022-02-14 RX ORDER — ESTRADIOL 0.1 MG/G
CREAM VAGINAL
COMMUNITY
Start: 2021-11-30

## 2022-02-14 ASSESSMENT — MIFFLIN-ST. JEOR: SCORE: 1578.94

## 2022-02-14 ASSESSMENT — ENCOUNTER SYMPTOMS
WEAKNESS: 0
HEADACHES: 0
PARESTHESIAS: 0
COUGH: 0
FREQUENCY: 0
SHORTNESS OF BREATH: 0
PALPITATIONS: 0
HEMATURIA: 0
JOINT SWELLING: 0
CHILLS: 0
HEMATOCHEZIA: 0
EYE PAIN: 0
DYSURIA: 0
BREAST MASS: 0
HEARTBURN: 0
CONSTIPATION: 0
ARTHRALGIAS: 0
NAUSEA: 0
DIARRHEA: 0
SORE THROAT: 0
DIZZINESS: 0
ABDOMINAL PAIN: 0
MYALGIAS: 0
FEVER: 0
NERVOUS/ANXIOUS: 0

## 2022-02-14 ASSESSMENT — PAIN SCALES - GENERAL: PAINLEVEL: NO PAIN (0)

## 2022-02-14 ASSESSMENT — ACTIVITIES OF DAILY LIVING (ADL): CURRENT_FUNCTION: NO ASSISTANCE NEEDED

## 2022-02-14 NOTE — RESULT ENCOUNTER NOTE
All of your labs were normal for you.  Please mail to patient.    Have a nice day!    Dr. Hand

## 2022-02-14 NOTE — PROGRESS NOTES
"   SUBJECTIVE:   CC: Addis Ferreira is an 67 year old woman who presents for preventive health visit.     Pt is unsure if she has Medicare yet.  There has been some uncertainty with her insurance coverage that she hasn't worked out yet.  We discussed    Pt will be planning on changing to estradiol cream from Premarin cream once she's done with it.  Changed to transdermal by her OB/GYN to help with VTE/stroke risk.  Mostly taking this to help with her urinary symptoms.      BP has been controlled.  But hasn't been checking at home.  No side effects.  Perhaps slight cough if she doesn't hydrate well.        Patient has been advised of split billing requirements and indicates understanding: Yes  Healthy Habits:     In general, how would you rate your overall health?  Fair    Frequency of exercise:  None    Do you usually eat at least 4 servings of fruit and vegetables a day, include whole grains    & fiber and avoid regularly eating high fat or \"junk\" foods?  Yes    Taking medications regularly:  Yes    Medication side effects:  None    Ability to successfully perform activities of daily living:  No assistance needed    Home Safety:  No safety concerns identified    Hearing Impairment:  No hearing concerns    In the past 6 months, have you been bothered by leaking of urine? Yes    In general, how would you rate your overall mental or emotional health?  Good      PHQ-2 Total Score: 0    Additional concerns today:  No            Today's PHQ-2 Score:   PHQ-2 ( 1999 [employer]) 2/14/2022   Q1: Little interest or pleasure in doing things 0   Q2: Feeling down, depressed or hopeless 0   PHQ-2 Score 0   PHQ-2 Total Score (12-17 Years)- Positive if 3 or more points; Administer PHQ-A if positive -   Q1: Little interest or pleasure in doing things Not at all   Q2: Feeling down, depressed or hopeless Not at all   PHQ-2 Score 0         Social History     Tobacco Use     Smoking status: Former Smoker     Packs/day: 1.00     Years: 30.00 "     Pack years: 30.00     Quit date: 2001     Years since quittin.0     Smokeless tobacco: Never Used   Substance Use Topics     Alcohol use: Yes     Comment: 0-3 drinks wkly         Alcohol Use 2022   Prescreen: >3 drinks/day or >7 drinks/week? No   Prescreen: >3 drinks/day or >7 drinks/week? -       Reviewed orders with patient.  Reviewed health maintenance and updated orders accordingly - Yes  BP Readings from Last 3 Encounters:   22 132/80   20 138/78   10/03/19 136/78    Wt Readings from Last 3 Encounters:   22 105.7 kg (233 lb)   20 107 kg (236 lb)   10/03/19 105.2 kg (232 lb)                  Patient Active Problem List   Diagnosis     Pityriasis rosea     Hypertension goal BP (blood pressure) < 140/90     CARDIOVASCULAR SCREENING; LDL GOAL LESS THAN 160     Advanced directives, counseling/discussion     Clear cell hidradenoma     Morbid obesity (H)     Hormone replacement therapy (HRT)     Past Surgical History:   Procedure Laterality Date     HC DILATION/CURETTAGE DIAG/THER NON OB      after miscarriage       Social History     Tobacco Use     Smoking status: Former Smoker     Packs/day: 1.00     Years: 30.00     Pack years: 30.00     Quit date: 2001     Years since quittin.0     Smokeless tobacco: Never Used   Substance Use Topics     Alcohol use: Yes     Comment: 0-3 drinks wkly     Family History   Problem Relation Age of Onset     Hypertension Father      Cerebrovascular Disease Paternal Grandmother      Cerebrovascular Disease Paternal Grandfather          Current Outpatient Medications   Medication Sig Dispense Refill     conjugated estrogens (PREMARIN) vaginal cream Place  vaginally three times a week. 42.5 g 12     estradiol (VIVELLE-DOT) 0.1 MG/24HR bi-weekly patch APPLY 1 PATCH TWICE WEEKLY AS DIRECTED       glucosamine-chondroitin 500-400 MG CAPS per capsule Take 1 capsule by mouth daily       hydrochlorothiazide (HYDRODIURIL) 25 MG tablet Take  1 tablet (25 mg) by mouth daily 15 tablet 0     lisinopril (ZESTRIL) 40 MG tablet Take 1 tablet (40 mg) by mouth daily 15 tablet 0     MULTI-DAY OR TABS   0     progesterone (PROMETRIUM) 200 MG capsule 200 mg       Vitamin D, Cholecalciferol, 25 MCG (1000 UT) CAPS        estradiol (ESTRACE) 0.1 MG/GM vaginal cream INSERT 1 GRAM VAGINALLY THREE TIMES EVERY WEEK.       Allergies   Allergen Reactions     No Known Drug Allergies      Recent Labs   Lab Test 12/03/20  1402 10/03/19  1017 04/04/19  0940 04/26/18  1002 03/23/17  0946 01/14/16  0831 01/15/15  1028 01/15/15  1013   LDL  --   --  109*  --  135* 119*   < >  --    HDL  --   --  39*  --  37* 40*   < >  --    TRIG  --   --  141  --  160* 220*   < >  --    ALT 22  --  21  --   --   --   --  25   CR 0.80 0.75 0.73   < > 0.76 0.85  --  0.85   GFRESTIMATED 78 85 88   < > 78 69  --  69   GFRESTBLACK 90 >90 >90   < > >90   GFR Calc   83  --  83   POTASSIUM 3.5 4.3 3.8   < > 4.0 4.0  --  4.0    < > = values in this interval not displayed.      Reviewed and updated as needed this visit by clinical staff  Tobacco  Allergies  Meds   Med Hx  Surg Hx  Fam Hx  Soc Hx       Reviewed and updated as needed this visit by Provider                   Review of Systems   Constitutional: Negative for chills and fever.   HENT: Negative for congestion, ear pain, hearing loss and sore throat.    Eyes: Negative for pain and visual disturbance.   Respiratory: Negative for cough and shortness of breath.    Cardiovascular: Negative for chest pain, palpitations and peripheral edema.   Gastrointestinal: Negative for abdominal pain, constipation, diarrhea, heartburn, hematochezia and nausea.   Breasts:  Positive for tenderness. Negative for breast mass and discharge.   Genitourinary: Negative for dysuria, frequency, genital sores, hematuria, pelvic pain, urgency, vaginal bleeding and vaginal discharge.   Musculoskeletal: Negative for arthralgias, joint swelling and  "myalgias.   Skin: Negative for rash.   Neurological: Negative for dizziness, weakness, headaches and paresthesias.   Psychiatric/Behavioral: Negative for mood changes. The patient is not nervous/anxious.           OBJECTIVE:   /80   Pulse 65   Temp 98.4  F (36.9  C) (Temporal)   Resp 20   Ht 1.613 m (5' 3.5\")   Wt 105.7 kg (233 lb)   LMP 06/30/2003   SpO2 97%   BMI 40.63 kg/m    Physical Exam  GENERAL: healthy, alert and no distress  NECK: no adenopathy, no asymmetry, masses, or scars and thyroid normal to palpation  RESP: lungs clear to auscultation - no rales, rhonchi or wheezes  CV: regular rate and rhythm, normal S1 S2, no S3 or S4, no murmur, click or rub, no peripheral edema and peripheral pulses strong  ABDOMEN: soft, nontender, no hepatosplenomegaly, no masses and bowel sounds normal  MS: no gross musculoskeletal defects noted, no edema  NEURO: fine resting tremor    Diagnostic Test Results:  Labs reviewed in Epic  No results found for this or any previous visit (from the past 24 hour(s)).  No results found for any visits on 02/14/22.    ASSESSMENT/PLAN:       ICD-10-CM    1. Hypertension goal BP (blood pressure) < 140/90  I10 CBC with platelets     Comprehensive metabolic panel (BMP + Alb, Alk Phos, ALT, AST, Total. Bili, TP)     lisinopril (ZESTRIL) 40 MG tablet     hydrochlorothiazide (HYDRODIURIL) 25 MG tablet     CBC with platelets     Comprehensive metabolic panel (BMP + Alb, Alk Phos, ALT, AST, Total. Bili, TP)   2. Hormone replacement therapy (HRT)  Z79.890    3. Morbid obesity (H)  E66.01      1.  Currently controlled.  Will continue current regimen and check monitoring labs today.  2.  Primarily managed by gynecology.  Discussed that we consider stopping her patch and seeing if vaginal estrogen was sufficient to control her urinary symptoms.  3.  Encouraged lifestyle modifications.  Will follow and assist with this as able.    Return for wellness check and other preventative issues " "once she has insurance coverage definitively.    Portions of this note were completed using Dragon dictation software.  Although reviewed, there may be typographical and other inadvertent errors that remain.         Estimated body mass index is 40.63 kg/m  as calculated from the following:    Height as of this encounter: 1.613 m (5' 3.5\").    Weight as of this encounter: 105.7 kg (233 lb).    Weight management plan: Discussed healthy diet and exercise guidelines    She reports that she quit smoking about 21 years ago. She has a 30.00 pack-year smoking history. She has never used smokeless tobacco.      Omar Hand MD, MD  [facility]  "

## 2022-02-14 NOTE — PATIENT INSTRUCTIONS
Thank you for visiting [facility]    Keep up the good work!    We'll let you know your lab results as soon as we can.     I would recommend returning for wellness check once your insurance is sorted out.  There are a number of preventive issues we should take care of.    Contact us or return if questions or concerns.     Have a nice day!    Dr. Hand     Return in about 4 weeks (around 3/14/2022) for Well check.      If you need medication refills, please contact your pharmacy 3 days before your prescriptions runs out or download the [facility] aubree for your smart phone. If you are out of refills, your pharmacy will contact contact the clinic.                                     MyChart Assistance [phone]

## 2023-02-02 DIAGNOSIS — I10 HYPERTENSION GOAL BP (BLOOD PRESSURE) < 140/90: ICD-10-CM

## 2023-02-02 RX ORDER — HYDROCHLOROTHIAZIDE 25 MG/1
25 TABLET ORAL DAILY
Qty: 30 TABLET | Refills: 0 | Status: SHIPPED | OUTPATIENT
Start: 2023-02-02 | End: 2023-02-17

## 2023-02-02 RX ORDER — LISINOPRIL 40 MG/1
40 TABLET ORAL DAILY
Qty: 30 TABLET | Refills: 0 | Status: SHIPPED | OUTPATIENT
Start: 2023-02-02 | End: 2023-02-17

## 2023-02-03 RX ORDER — LISINOPRIL 40 MG/1
TABLET ORAL
Qty: 90 TABLET | OUTPATIENT
Start: 2023-02-03

## 2023-02-17 ENCOUNTER — OFFICE VISIT (OUTPATIENT)
Dept: FAMILY MEDICINE | Facility: OTHER | Age: 68
End: 2023-02-17
Payer: COMMERCIAL

## 2023-02-17 ENCOUNTER — LAB (OUTPATIENT)
Dept: FAMILY MEDICINE | Facility: OTHER | Age: 68
End: 2023-02-17

## 2023-02-17 VITALS
RESPIRATION RATE: 20 BRPM | SYSTOLIC BLOOD PRESSURE: 124 MMHG | HEIGHT: 64 IN | DIASTOLIC BLOOD PRESSURE: 70 MMHG | OXYGEN SATURATION: 98 % | TEMPERATURE: 98.2 F | BODY MASS INDEX: 38.16 KG/M2 | WEIGHT: 223.5 LBS | HEART RATE: 75 BPM

## 2023-02-17 DIAGNOSIS — M25.561 ACUTE PAIN OF RIGHT KNEE: ICD-10-CM

## 2023-02-17 DIAGNOSIS — Z23 NEED FOR VACCINATION: ICD-10-CM

## 2023-02-17 DIAGNOSIS — Z12.11 SCREEN FOR COLON CANCER: ICD-10-CM

## 2023-02-17 DIAGNOSIS — Z12.31 VISIT FOR SCREENING MAMMOGRAM: ICD-10-CM

## 2023-02-17 DIAGNOSIS — I10 HYPERTENSION GOAL BP (BLOOD PRESSURE) < 140/90: Primary | ICD-10-CM

## 2023-02-17 DIAGNOSIS — E66.01 MORBID OBESITY (H): ICD-10-CM

## 2023-02-17 LAB
ALBUMIN SERPL BCG-MCNC: 4.1 G/DL (ref 3.5–5.2)
ALP SERPL-CCNC: 82 U/L (ref 35–104)
ALT SERPL W P-5'-P-CCNC: 19 U/L (ref 10–35)
ANION GAP SERPL CALCULATED.3IONS-SCNC: 13 MMOL/L (ref 7–15)
AST SERPL W P-5'-P-CCNC: 15 U/L (ref 10–35)
BILIRUB SERPL-MCNC: 0.3 MG/DL
BUN SERPL-MCNC: 14.9 MG/DL (ref 8–23)
CALCIUM SERPL-MCNC: 9.7 MG/DL (ref 8.8–10.2)
CHLORIDE SERPL-SCNC: 99 MMOL/L (ref 98–107)
CREAT SERPL-MCNC: 0.79 MG/DL (ref 0.51–0.95)
DEPRECATED HCO3 PLAS-SCNC: 28 MMOL/L (ref 22–29)
GFR SERPL CREATININE-BSD FRML MDRD: 82 ML/MIN/1.73M2
GLUCOSE SERPL-MCNC: 101 MG/DL (ref 70–99)
POTASSIUM SERPL-SCNC: 4 MMOL/L (ref 3.4–5.3)
PROT SERPL-MCNC: 7.5 G/DL (ref 6.4–8.3)
SODIUM SERPL-SCNC: 140 MMOL/L (ref 136–145)

## 2023-02-17 PROCEDURE — 90677 PCV20 VACCINE IM: CPT | Performed by: FAMILY MEDICINE

## 2023-02-17 PROCEDURE — G0009 ADMIN PNEUMOCOCCAL VACCINE: HCPCS | Performed by: FAMILY MEDICINE

## 2023-02-17 PROCEDURE — 99214 OFFICE O/P EST MOD 30 MIN: CPT | Mod: 25 | Performed by: FAMILY MEDICINE

## 2023-02-17 PROCEDURE — 36415 COLL VENOUS BLD VENIPUNCTURE: CPT | Performed by: FAMILY MEDICINE

## 2023-02-17 PROCEDURE — 80053 COMPREHEN METABOLIC PANEL: CPT | Performed by: FAMILY MEDICINE

## 2023-02-17 RX ORDER — LISINOPRIL 40 MG/1
40 TABLET ORAL DAILY
Qty: 90 TABLET | Refills: 3 | Status: SHIPPED | OUTPATIENT
Start: 2023-02-17 | End: 2024-01-31

## 2023-02-17 RX ORDER — HYDROCHLOROTHIAZIDE 25 MG/1
25 TABLET ORAL DAILY
Qty: 90 TABLET | Refills: 3 | Status: SHIPPED | OUTPATIENT
Start: 2023-02-17 | End: 2024-01-31

## 2023-02-17 ASSESSMENT — PAIN SCALES - GENERAL: PAINLEVEL: NO PAIN (1)

## 2023-02-17 NOTE — PROGRESS NOTES
"Patient reports new medications or medications changes. Provider please review. Biotin and others    Assessment & Plan       ICD-10-CM    1. Hypertension goal BP (blood pressure) < 140/90  I10 lisinopril (ZESTRIL) 40 MG tablet     hydrochlorothiazide (HYDRODIURIL) 25 MG tablet     Comprehensive metabolic panel (BMP + Alb, Alk Phos, ALT, AST, Total. Bili, TP)     Comprehensive metabolic panel (BMP + Alb, Alk Phos, ALT, AST, Total. Bili, TP)      2. Acute pain of right knee  M25.561 Physical Therapy Referral      3. Morbid obesity (H)  E66.01       4. Screen for colon cancer  Z12.11 COLOGUARD([employer])      5. Visit for screening mammogram  Z12.31 MA SCREENING DIGITAL BILAT - Future  (s+30)      6. Need for vaccination  Z23 Pneumococcal 20 Valent Conjugate (PCV20)           1.  Clinically controlled.  We will continue current regimen and check monitoring labs.  2.  Unclear etiology for her symptoms.  Unable to reproduce pain on exam.  Over-the-counter analgesics have been inadequate and fully controlling her symptoms.  Discussed a possible trial of physical therapy.  Patient wished to proceed with this.  3.  Encouraged lifestyle modifications to help with this.  We will follow and assist with this as able.  4.  Screening options briefly reviewed.  Patient wished to proceed with Cologuard.  This was ordered.  5.  Screening ordered.  6.  Immunized.    Portions of this note were completed using Dragon dictation software.  Although reviewed, there may be typographical and other inadvertent errors that remain.       Ordering of each unique test  Prescription drug management         BMI:   Estimated body mass index is 38.36 kg/m  as calculated from the following:    Height as of this encounter: 1.626 m (5' 4\").    Weight as of this encounter: 101.4 kg (223 lb 8 oz).   Weight management plan: Discussed healthy diet and exercise guidelines    Patient Instructions   Thank you for visiting [facility]    See if PT helps with your knee.  Let me know if it's not.    Get your screenings updated.    We'll let you know your lab results as soon as we can.     Keep working on being active and eating healthy.      Let me know if problems.      Contact us or return if questions or concerns.     Have a nice day!    Dr. Hand     Return in about 6 months (around 8/17/2023) for Well check.      If you need medication refills, please contact your pharmacy 3 days before your prescriptions runs out or download the [facility] aubree for your smart phone. If you are out of refills, your pharmacy will contact contact the clinic.                                     [employer] Assistance [phone]                    Return in about 6 months (around 8/17/2023) for Well check.    Omar Hand MD, MD  [facility] DOM Mancini is a 68 year old, presenting for the following health issues:  Hypertension      History of Present Illness       Hypertension: She presents for follow up of hypertension.  She does not check blood pressure  regularly outside of the clinic. Outside blood pressures have been over 140/90. She does not follow a low salt diet.         About three weeks ago she noted that she started having sharp pains behind her right knee.  This is sharp pain in the  Back of her knee, very brief.  Pivoting or bending hurts. Ibuprofen, tyelnol, apsirin. Takes about 1-2 pills of each of them. This does help.     Still on estrogen, halved the dose. Working on weaning off.            Review of Systems   Constitutional, HEENT, cardiovascular, pulmonary, gi and gu systems are negative, except as otherwise noted.      Objective    /70 (Cuff Size: Adult Large)   Pulse 75   Temp 98.2  F (36.8  C) (Temporal)   Resp 20   Ht 1.626 m (5' 4\")   Wt 101.4 kg (223 lb 8 oz)   LMP 06/30/2003   SpO2 98%   BMI 38.36 kg/m    Body mass index is 38.36 kg/m .  Physical Exam   GENERAL: healthy, " alert and no distress  EYES: Eyes grossly normal to inspection, PERRL and conjunctivae and sclerae normal  RESP: lungs clear to auscultation - no rales, rhonchi or wheezes  CV: regular rate and rhythm, normal S1 S2, no S3 or S4, no murmur, click or rub, no peripheral edema and peripheral pulses strong  ABDOMEN: soft, nontender, no hepatosplenomegaly, no masses and bowel sounds normal  MS: no gross musculoskeletal defects noted, no edema  SKIN: no suspicious lesions or rashes  NEURO: Normal strength and tone, mentation intact and speech normal  PSYCH: mentation appears normal, affect normal/bright    Office Visit on 02/14/2022   Component Date Value Ref Range Status     WBC Count 02/14/2022 10.1  4.0 - 11.0 10e3/uL Final     RBC Count 02/14/2022 4.44  3.80 - 5.20 10e6/uL Final     Hemoglobin 02/14/2022 13.3  11.7 - 15.7 g/dL Final     Hematocrit 02/14/2022 40.9  35.0 - 47.0 % Final     MCV 02/14/2022 92  78 - 100 fL Final     MCH 02/14/2022 30.0  26.5 - 33.0 pg Final     MCHC 02/14/2022 32.5  31.5 - 36.5 g/dL Final     RDW 02/14/2022 13.9  10.0 - 15.0 % Final     Platelet Count 02/14/2022 369  150 - 450 10e3/uL Final     Sodium 02/14/2022 138  133 - 144 mmol/L Final     Potassium 02/14/2022 3.8  3.4 - 5.3 mmol/L Final     Chloride 02/14/2022 104  94 - 109 mmol/L Final     Carbon Dioxide (CO2) 02/14/2022 28  20 - 32 mmol/L Final     Anion Gap 02/14/2022 6  3 - 14 mmol/L Final     Urea Nitrogen 02/14/2022 15  7 - 30 mg/dL Final     Creatinine 02/14/2022 0.78  0.52 - 1.04 mg/dL Final     Calcium 02/14/2022 9.5  8.5 - 10.1 mg/dL Final     Glucose 02/14/2022 98  70 - 99 mg/dL Final     Alkaline Phosphatase 02/14/2022 79  40 - 150 U/L Final     AST 02/14/2022 15  0 - 45 U/L Final     ALT 02/14/2022 31  0 - 50 U/L Final     Protein Total 02/14/2022 7.7  6.8 - 8.8 g/dL Final     Albumin 02/14/2022 3.3 (L)  3.4 - 5.0 g/dL Final     Bilirubin Total 02/14/2022 0.3  0.2 - 1.3 mg/dL Final     GFR Estimate 02/14/2022 84  >60  mL/min/1.73m2 Final    Effective December 21, 2021 eGFRcr in adults is calculated using the 2021 CKD-EPI creatinine equation which includes age and gender (Georges et al., NEJ, DOI: 10.1056/WEGJtu6279527)     No results found for any visits on 02/17/23.  No results found for this or any previous visit (from the past 24 hour(s)).    Physician Attestation   I, Omar Hand MD, MD, was present with the medical/ASHLEY student who participated in the service and in the documentation of the note.  I have verified the history and personally performed the physical exam and medical decision making.  I agree with the assessment and plan of care as documented in the note.      Items personally reviewed: vitals, labs and exam and agree with the interpretation documented in the note.    Omar Hand MD, MD

## 2023-02-17 NOTE — LETTER
February 20, 2023      Addis Ferreira  [id number] [address] [place] [id number]        Dear ,    We are writing to inform you of your test results.    Your test results fall within the expected range(s) or remain unchanged from previous results.  Please continue with current treatment plan.    Resulted Orders   Comprehensive metabolic panel (BMP + Alb, Alk Phos, ALT, AST, Total. Bili, TP)   Result Value Ref Range    Sodium 140 136 - 145 mmol/L    Potassium 4.0 3.4 - 5.3 mmol/L    Chloride 99 98 - 107 mmol/L    Carbon Dioxide (CO2) 28 22 - 29 mmol/L    Anion Gap 13 7 - 15 mmol/L    Urea Nitrogen 14.9 8.0 - 23.0 mg/dL    Creatinine 0.79 0.51 - 0.95 mg/dL    Calcium 9.7 8.8 - 10.2 mg/dL    Glucose 101 (H) 70 - 99 mg/dL    Alkaline Phosphatase 82 35 - 104 U/L    AST 15 10 - 35 U/L    ALT 19 10 - 35 U/L    Protein Total 7.5 6.4 - 8.3 g/dL    Albumin 4.1 3.5 - 5.2 g/dL    Bilirubin Total 0.3 <=1.2 mg/dL    GFR Estimate 82 >60 mL/min/1.73m2      Comment:      eGFR calculated using 2021 CKD-EPI equation.                   All of your labs were normal for you.     Your blood sugar is barely in the prediabetic range.  Can try to prevent this from getting worse by exercising regularly and controlling weight and diet.       Have a nice day!     Dr. Hand         If you have any questions or concerns, please call the clinic at the number listed above.

## 2023-02-17 NOTE — PATIENT INSTRUCTIONS
Thank you for visiting [facility]    See if PT helps with your knee.  Let me know if it's not.    Get your screenings updated.    We'll let you know your lab results as soon as we can.     Keep working on being active and eating healthy.      Let me know if problems.      Contact us or return if questions or concerns.     Have a nice day!    Dr. Hand     Return in about 6 months (around 8/17/2023) for Well check.      If you need medication refills, please contact your pharmacy 3 days before your prescriptions runs out or download the [facility] aubree for your smart phone. If you are out of refills, your pharmacy will contact contact the clinic.                                     [employer] Assistance [phone]

## 2023-02-18 NOTE — RESULT ENCOUNTER NOTE
All of your labs were normal for you.    Patient's blood sugar is barely in the prediabetic range.  Can try to prevent this from getting worse by exercising regularly and controlling weight and diet.      Have a nice day!    Dr. Hand

## 2023-03-15 ENCOUNTER — HOSPITAL ENCOUNTER (OUTPATIENT)
Dept: MAMMOGRAPHY | Facility: CLINIC | Age: 68
Discharge: HOME | End: 2023-03-15
Attending: FAMILY MEDICINE | Admitting: FAMILY MEDICINE
Payer: COMMERCIAL

## 2023-03-15 DIAGNOSIS — Z12.31 VISIT FOR SCREENING MAMMOGRAM: ICD-10-CM

## 2023-03-15 PROCEDURE — 77067 SCR MAMMO BI INCL CAD: CPT

## 2023-04-25 ENCOUNTER — HOSPITAL ENCOUNTER (OUTPATIENT)
Dept: PHYSICAL THERAPY | Facility: CLINIC | Age: 68
Setting detail: THERAPIES SERIES
Discharge: HOME | End: 2023-04-25
Attending: FAMILY MEDICINE
Payer: COMMERCIAL

## 2023-04-25 DIAGNOSIS — M25.561 ACUTE PAIN OF RIGHT KNEE: ICD-10-CM

## 2023-04-25 PROCEDURE — 97112 NEUROMUSCULAR REEDUCATION: CPT | Mod: GP | Performed by: PHYSICAL THERAPIST

## 2023-04-25 PROCEDURE — 97161 PT EVAL LOW COMPLEX 20 MIN: CPT | Mod: GP | Performed by: PHYSICAL THERAPIST

## 2023-04-25 PROCEDURE — 97110 THERAPEUTIC EXERCISES: CPT | Mod: GP | Performed by: PHYSICAL THERAPIST

## 2023-04-26 NOTE — PROGRESS NOTES
04/25/23 0845   General Information   Type of Visit Initial OP Ortho PT Evaluation   Start of Care Date 04/25/23   Referring Physician Omar Hand MD   Patient/Family Goals Statement discover what is plaguing her and prevent recurrence of severe pain and be more active.   Orders Evaluate and Treat   Orders Comment per associated diagnosis   Date of Order 02/17/23   Certification Required? Yes  ([employer] Advantage)   Medical Diagnosis Acute pain of right knee (M25.561)   Surgical/Medical history reviewed Yes   Precautions/Limitations no known precautions/limitations   Weight-Bearing Status - LUE full weight-bearing   Weight-Bearing Status - RUE full weight-bearing   Weight-Bearing Status - LLE full weight-bearing   Weight-Bearing Status - RLE full weight-bearing   General Information Comments Pertinent Medical History: Pityriasis rosea, HTN, clear cell hidradenoma, morbid obesity, hormone replacement therapy       Present No   Body Part(s)   Body Part(s) Knee   Presentation and Etiology   Pertinent history of current problem (include personal factors and/or comorbidities that impact the POC) 67 yo female referred to PT for R knee pain. Insidious onset and recalled a history of R knee symptoms (numbness in medial knee R) when she was working. No trauma or surgery to the R knee, and no hip or L knee issues. History of L low back and L leg sciatica issues 11 yrs ago. Used CBD oil in the past for R knee pain with success. Not regularly exercising - stopped years ago when she started working at [employer] in [place]. Morris standing and walking throughout her work day was enough exercise. Decreased activity and increased weight gain last 10 yrs that may be contributing to her fatigue. It can take a day or two to regain energy. She is not cleaning as much as she had. Sleep: cannot lay on R side comfortably anymore. Describes a  twinge of something . Able to lay on L side  okay. Notes increased pain when she first lays down and when pain returns during the night, it is not as bad.   Impairments A. Pain;F. Decreased strength and endurance  (antalgic when painful - not now)   Functional Limitations Other;perform required work activities;perform activities of daily living  (sleeping, kneeling, squatting)   Symptom Location Pain R posterior and anterior knee. Tenderness medial and inferior knee. No symptoms now except at night.   How/Where did it occur From insidious onset   Onset date of current episode/exacerbation 01/27/23  (approximate date)   Chronicity Chronic   Pain rating (0-10 point scale) Other   Pain rating comment now: 0/10, increases to 3-4/10 (beinning of night when trying to sleep but later in the evening can lay longer).   Pain quality A. Sharp  (sharp in past and currently aching)   Frequency of pain/symptoms B. Intermittent   Pain/symptoms are: Worse during the night  (positioning related)   Pain/symptoms exacerbated by M. Other   Pain exacerbation comment kneeling, squatting, sleeping position, occasionally rising from chair, immediately stepping on R leg in the morning,  (issues coming on for years)   Pain/symptoms eased by K. 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 No   Have you fallen and had an injury in the past  "year? No   Is patient a fall risk? No   Abuse Screen (yes response referral indicated)   Feels Unsafe at Home or Work/School no   Feels Threatened by Someone no   Does Anyone Try to Keep You From Having Contact with Others or Doing Things Outside Your Home? no   Physical Signs of Abuse Present no   Functional Scales   Functional Scales Other   Other Scales  LEFS: 56/80   Knee Objective Findings   Gait/Locomotion increased frontal shift   Knee ROM Comment Standing low back AROM: full flexion with R deviation and no change in symptoms; extension: full ROM with R deviation moderate with repeated movements total of 10 reps.   Knee/Hip Strength Comments Squat with arms overhead - limited ROM with pulling(arms overhead) from calf to knee R. 4 \" step up: L: fair x 3  and down L: normal and  R: hip ER with valgum x 3 reps. Supine hip flexion: poor trunk control and 4-/5 bilaterally, quads 3+/5 supine 90-90 bilaterally, sidelying hip abduction and adduction R: abduction 4/5 and adduction 3+/5; L: abduction: 4-/5 and adduction 4/5   Knee Flexibility Comments decreased   Palpation Tender along MCL R knee and medial joint line   Accessory Motion/Joint Mobility Normal with posterior/anterior glides   Observation no acute distress, expresses concern about pain and limited activities.   Integumentary  negative   Posture Standing: Shifts position between hip ER and eversion R<->L, shoulders shifted to the R.   Lachmans Test negative   Anterior Drawer Test negative   Posterior Drawer Test negative   Varus Stress Test negative   Valgus Stress Test positive   Althea's Test positive over MCL   Planned Therapy Interventions   Planned Therapy Interventions joint mobilization;manual therapy;neuromuscular re-education;strengthening;ROM;stretching   Planned Modality Interventions   Planned Modality Interventions Comments as needed   Clinical Impression   Criteria for Skilled Therapeutic Interventions Met yes, treatment indicated   PT " Diagnosis R knee pain   Influenced by the following impairments decreased strength core and legs, weight, limited activity level, fatigue/energy, sleep   Functional limitations due to impairments sleeping, squatting, kneeling, cleaning   Clinical Presentation Stable/Uncomplicated   Clinical Presentation Rationale clinical judgement   Clinical Decision Making (Complexity) Low complexity   Predicted Duration of Therapy Intervention (days/wks) 3-4 sessions every 2-3 weeks x 10 weeks due to her schedule   Risk & Benefits of therapy have been explained Yes   Patient, Family & other staff in agreement with plan of care Yes   Clinical Impression Comments Positive MCL tests and meniscal tests are correlating to her increased genu valgum with steps, squats and general knee WB with flexion may be flaring symptoms. Skilled intervention for strengthening, conditioning, and flexibility. Dicussed briefly use of  brace.   Education Assessment   Preferred Learning Style Listening;Reading;Demonstration;Pictures/video   Barriers to Learning No barriers   ORTHO GOALS   PT Ortho Eval Goals 1;2;3   Ortho Goal 1   Goal Identifier Strength   Goal Description Addis will complete her home program to increase her strength to minimum of 4/5 lower extremity and core strength   Target Date 06/09/23   Ortho Goal 2   Goal Identifier Flexion techniques   Goal Description Addis will understand proper knee mechanics for squats eg for steps, getting into and out of vehicles, getting to floor for reducing symptoms and knee irritation   Target Date 06/02/23   Ortho Goal 3   Goal Identifier Caring for granschildren   Goal Description Addis will be able to play and care for her grandchildren without noting increased symptoms   Target Date 06/09/23   Total Evaluation Time   PT Chan Low Complexity Minutes (21754) 30   Therapy Certification   Certification date from 04/25/23   Certification date to 07/04/23   Medical Diagnosis Acute pain of right knee  (J53.273)

## 2023-04-26 NOTE — PROGRESS NOTES
[facility]    OUTPATIENT PHYSICAL THERAPY ORTHOPEDIC EVALUATION  PLAN OF TREATMENT FOR OUTPATIENT REHABILITATION  (COMPLETE FOR INITIAL CLAIMS ONLY)  Patient's Last Name, First Name, M.I.  YOB: 1955  Addis Ferreira    Provider s Name:  [facility]   Medical Record No.  [id number]   Start of Care Date:  04/25/23   Onset Date:  01/27/23 (approximate date)   Type:     _X__PT   ___OT   ___SLP Medical Diagnosis:  Acute pain of right knee (M25.561)     PT Diagnosis:  R knee pain   Visits from SOC:  1      _________________________________________________________________________________  Plan of Treatment/Functional Goals:  joint mobilization, manual therapy, neuromuscular re-education, strengthening, ROM, stretching        as needed  Goals  Goal Identifier: Strength  Goal Description: Addis will complete her home program to increase her strength to minimum of 4/5 lower extremity and core strength  Target Date: 06/09/23    Goal Identifier: Flexion techniques  Goal Description: Addis will understand proper knee mechanics for squats eg for steps, getting into and out of vehicles, getting to floor for reducing symptoms and knee irritation  Target Date: 06/02/23    Goal Identifier: Caring for sarthak  Goal Description: Addis will be able to play and care for her grandchildren without noting increased symptoms  Target Date: 06/09/23    Therapy Frequency:     Predicted Duration of Therapy Intervention:  3-4 sessions every 2-3 weeks x 10 weeks due to her schedule    Nan Santos, PT                 I CERTIFY THE NEED FOR THESE SERVICES FURNISHED UNDER        THIS PLAN OF TREATMENT AND WHILE UNDER MY CARE     (Physician co-signature of this document indicates review and certification of the therapy plan).                       Certification Date From:  04/25/23    Certification Date To:  07/04/23    Referring Provider:  Omar Hand MD    Initial Assessment        See Epic Evaluation Start of Care Date: 04/25/23

## 2023-05-11 ENCOUNTER — HOSPITAL ENCOUNTER (OUTPATIENT)
Dept: PHYSICAL THERAPY | Facility: CLINIC | Age: 68
Setting detail: THERAPIES SERIES
Discharge: HOME | End: 2023-05-11
Attending: FAMILY MEDICINE
Payer: COMMERCIAL

## 2023-05-11 PROCEDURE — 97110 THERAPEUTIC EXERCISES: CPT | Mod: GP | Performed by: PHYSICAL THERAPIST

## 2023-06-16 ENCOUNTER — THERAPY VISIT (OUTPATIENT)
Dept: PHYSICAL THERAPY | Facility: CLINIC | Age: 68
End: 2023-06-16
Attending: FAMILY MEDICINE
Payer: COMMERCIAL

## 2023-06-16 DIAGNOSIS — M25.561 ACUTE PAIN OF RIGHT KNEE: Primary | ICD-10-CM

## 2023-06-16 PROCEDURE — 97110 THERAPEUTIC EXERCISES: CPT | Mod: GP | Performed by: PHYSICAL THERAPIST

## 2023-06-16 PROCEDURE — 97530 THERAPEUTIC ACTIVITIES: CPT | Mod: GP | Performed by: PHYSICAL THERAPIST

## 2023-06-18 NOTE — PROGRESS NOTES
[facility]                                                                                   OUTPATIENT PHYSICAL THERAPY    PLAN OF TREATMENT FOR OUTPATIENT REHABILITATION   Patient's Last Name, First Name, Addis Bailey YOB: 1955   Provider's Name   [facility]   Medical Record No.  [id number]     Onset Date: 01/27/23  Start of Care Date:  4-     Medical Diagnosis:  Acute pain of right knee (M25.561)      PT Treatment Diagnosis:  R knee pain Plan of Treatment  Frequency/Duration: 1-2 sessions x 3-4 weeks    Certification date from 6-  to   7-       See note for plan of treatment details and functional goals     Nan Santos, PT                         I CERTIFY THE NEED FOR THESE SERVICES FURNISHED UNDER        THIS PLAN OF TREATMENT AND WHILE UNDER MY CARE     (Physician attestation of this document indicates review and certification of the therapy plan).                Referring Provider:  Omar Hand MD      Initial Assessment  See Epic Evaluation-             06/16/23 0500   Appointment Info   Signing clinician's name / credentials Nan Santos PT LAT [facility]PS   Visits Used 3   Medical Diagnosis Acute pain of right knee (M25.561)   PT Tx Diagnosis R knee pain   Precautions/Limitations no known precautions/limitations   Progress Note/Certification   Onset of illness/injury or Date of Surgery 01/27/23   Therapy Frequency 4-   Progress Note Due Date 07/04/23   Progress Note Completed Date 06/16/23       Present No   GOALS   PT Goals 2;3   PT Goal 1   Goal Identifier Strength   Goal Description Addis will complete her home program to increase her strength to minimum of 4/5 lower extremity and core strength   Goal Progress Improving, able to get out of car easier.   Target Date 07/25/23   PT Goal 2   Goal Identifier flexion techniques   Goal Description Addis will  understand proper knee mechanics for squats eg for steps, getting into and out of vehicles, getting to floor for reducing symptoms and knee irritation   Goal Progress progressing, knee symptoms with deeper squats and lunges eg getting off floor   Target Date 06/30/23   PT Goal 3   Goal Identifier Playing with grandchildren   Goal Description Addis will be able to play and care for her grandchildren without noting increased symptoms   Goal Progress better at getting to floor, hard to get back up.   Target Date 07/14/23   Subjective Report   Subjective Report No pain. Legs feel tired and first steps a little slow. abdominal sets are getting easier and helps with posture. steps can vary, not sure if she can squat. Easier to get out of car. Better endurance on feet. feels like she is not able to be on feet for her full work day  - 6 hours   Objective Measures   Objective Measures Objective Measure 1;Objective Measure 2;Objective Measure 3   Objective Measure 1   Objective Measure AROM   Details Knee flexion: R: 116, L: 116. Knee extension: R: 0, L: 0. Hip flexion straight leg: R: 60, L: 65. Hip abduction bilat: WFL, Hip adduction bilat: WFL.   Objective Measure 2   Objective Measure MMT   Details Addis will be able to play and care for her grandchildren without noting increased symptoms   Objective Measure 3   Objective Measure 5xSTS   Details not measured today   Treatment Interventions (PT)   Interventions Therapeutic Procedure/Exercise;Therapeutic Activity   Therapeutic Procedure/Exercise   Therapeutic Procedures: strength, endurance, ROM, flexibillity minutes (22141) 18   Skilled Intervention instruction, demonstration and activity selection   Patient Response/Progress better with abdominal strength and mobility   Ther Proc 1 - Details Review of home program: recommended she continue with the exercises and can decrease to 2-3 times per day based on her progress. demonstrated and practiced minisquats 1 x 15 and forward  lunging 1 x 10, consider step ups for endurance as well as strength.   Therapeutic Activity   Therapeutic Activities: dynamic activities to improve functional performance minutes (91575) 26   Ther Act 1 - Details Education in lifestyle choices that affect pain such as substance use, nutrition, social life/relationships and sleep. Educated in ways to make exercise fun such as dancing using minisquats and lunges, step ups as noted above vs counting. Working on strength and using weights imitating her daily activities such as heel lifts with reaching, lifting repeated to comfort with light weights as able. Sitting on floor and working on hip mobility with knee protection and working to floor to stand using legs.   Skilled Intervention instruction, practice and demonstration, education   Patient Response/Progress UNderstood, liked the dancing to music for exercise for strength and endurance   Education   Learner/Method Patient;Listening;Reading;Demonstration;No Barriers to Learning   Education Comments ways to improve strength, hip mobility, and endurance creatively for compliance and to make fun.   Plan   Homework Continue with HEP until next session (06-01-23)   Home program NEW: Exercise Name: Seated Hip Abduction with Band, Sets: 1-2 - Reps: 15-20 - Sessions: every other day, Notes: Control the band. Use green TB.  Exercise Name: Seated Hamstring Curl with Tubing, Sets: 1-2 - Reps: 15-20 - Sessions: every other day, Notes: RIGHT LEG ONLY.  Control the band. Use green TB. Either around object or other foot.  Exercise Name: Sit to Stand, Sets: 1-2 - Reps: 10 - Sessions: Every other day, Notes: work up to 2 sets. CONTINUE: Quad sets in sitting, supine or standing goal: 1 x 10 seconds x 10 reps bilaterally, gluteal sets 1 x 10 seconds x 5 reps x 2-3 times per day, Isometric lower abdominal strengthening 1 x 5->10 seconds x 10 reps x 2-3 times per day. Dancing with squats, lunges for endurance and develop an indep home  program using light weights, reps and functional activities.   Updates to plan of care 1-2 sessions in the next 2-3 weeks   Plan for next session Advance home program as needed for endurance and strength, measure steps and gait, lift techniques   Comments   Comments no pain unless squatting and lunging to get onto floor. Feels her big issues right now are hip mobility for sitting on floor, strength to get out of car and up from floor, and endurance for steps and walking. nice improvement in her symptoms.   Total Session Time   Timed Code Treatment Minutes 44   Total Treatment Time (sum of timed and untimed services) 44

## 2023-07-20 ENCOUNTER — THERAPY VISIT (OUTPATIENT)
Dept: PHYSICAL THERAPY | Facility: CLINIC | Age: 68
End: 2023-07-20
Attending: FAMILY MEDICINE
Payer: COMMERCIAL

## 2023-07-20 DIAGNOSIS — M25.561 ACUTE PAIN OF RIGHT KNEE: Primary | ICD-10-CM

## 2023-07-20 PROCEDURE — 97112 NEUROMUSCULAR REEDUCATION: CPT | Mod: GP | Performed by: PHYSICAL THERAPIST

## 2023-07-20 NOTE — PROGRESS NOTES
DISCHARGE  Reason for Discharge: Meeting goals and improving    Equipment Issued: [email or web address]    Discharge Plan: Patient to continue home program.    Referring Provider:  Omar Hand MD     07/20/23 0500   Appointment Info   Signing clinician's name / credentials Nan Santos, PT LAT FPS   Visits Used 4   Medical Diagnosis Acute pain of right knee (M25.561)   PT Tx Diagnosis R knee pain   Precautions/Limitations no known precautions/limitations   Progress Note/Certification   Onset of illness/injury or Date of Surgery 01/27/23   Therapy Frequency 4-   Progress Note Due Date 07/04/23   Progress Note Completed Date 07/20/23       Present No   GOALS   PT Goals 2;3   PT Goal 1   Goal Identifier Strength   Goal Description Addis will complete her home program to increase her strength to minimum of 4/5 lower extremity and core strength   Goal Progress Improving, able to get out of car easier, leg strength 5/5   Target Date 08/10/23   PT Goal 2   Goal Identifier flexion techniques   Goal Description Addis will understand proper knee mechanics for squats eg for steps, getting into and out of vehicles, getting to floor for reducing symptoms and knee irritation   Goal Progress Same techniques and easier eg floor to stand getting into vehicle without pain. Feels stronger.   Target Date 06/30/23   Date Met 07/20/23   PT Goal 3   Goal Identifier Playing with grandchildren   Goal Description Addis will be able to play and care for her grandchildren without noting increased symptoms   Goal Progress Turned them down less for play, cannot keep up with them.   Target Date 07/14/23   Date Met 07/20/23   Subjective Report   Subjective Report Exercises are working. Does not like hot weather and hard to motivate. Does her exercises daily. Pain she came infor gone. Can sleep on her R side now. Wants to be strong enough to feel safe and steady especially in winter.   Objective Measures   Objective  Measures Objective Measure 1;Objective Measure 2;Objective Measure 3;Objective Measure 4   Objective Measure 1   Objective Measure AROM   Details R: -1 to 126 degrees   Objective Measure 2   Objective Measure MMT   Details 5/5 quads, hamstrings, hip adductors and abductors   Objective Measure 3   Objective Measure 5xSTS   Details 13 seconds, no pain. Improved from 17+ seconds   Objective Measure 4   Objective Measure LEFS   Details 50/80 decreased from 56/80 and we discussed possibility for this   Treatment Interventions (PT)   Interventions Neuromuscular Re-education   Neuromuscular Re-education   Neuromuscular re-ed of mvmt, balance, coord, kinesthetic sense, posture, proprioception minutes (53158) 44   Skilled Intervention instruction, recheck and activity selection for improving balance and strength   Patient Response/Progress understood and happy with her progress   Neuro Re-ed 1 - Details Discussed progress with her home program. reviewed home program and added balance activities: progression: tandem standing bilaterally 1 x 1-2 x 40 seconds x 2-3 times per day=>tandem walking 1 x 10-20 reps x 2-3 times per day-=> walking tandem backward 1 x 10-20 reps x 2-3 times per day, issued green, blue and black band for use around feet for clocks, walking step outs. Recommended continuing with home program once symptoms improve and decreasing to 3-5 days per week.   Education   Learner/Method Patient;Listening;Reading;Demonstration;No Barriers to Learning   Education Comments Discussed progress with her home program. reviewed home program and added balance activities: progression: tandem standing bilaterally 1 x 1-2 x 40 seconds x 2-3 times per day=>tandem walking 1 x 10-20 reps x 2-3 times per day-=> walking tandem backward 1 x 10-20 reps x 2-3 times per day, issued green, blue and black band for use around feet for clocks, walking step outs. Recommended continuing with home program once symptoms improve and decreasing  to 3-5 days per week.   Plan   Homework Continue with HEP until next session (06-01-23), Discussed progress with her home program. reviewed home program and added balance activities: progression: tandem standing bilaterally 1 x 1-2 x 40 seconds x 2-3 times per day=>tandem walking 1 x 10-20 reps x 2-3 times per day-=> walking tandem backward 1 x 10-20 reps x 2-3 times per day, issued green, blue and black band for use around feet for clocks, walking step outs. Recommended continuing with home program once symptoms improve and decreasing to 3-5 days per week.   Home program Exercise Name: Seated Hip Abduction with Band, Sets: 1-2 - Reps: 15-20 - Sessions: every other day, Notes: Control the band. Use green TB.  Exercise Name: Seated Hamstring Curl with Tubing, Sets: 1-2 - Reps: 15-20 - Sessions: every other day, Notes: RIGHT LEG ONLY.  Control the band. Use green TB. Either around object or other foot.  Exercise Name: Sit to Stand, Sets: 1-2 - Reps: 10 - Sessions: Every other day, Notes: work up to 2 sets. CONTINUE: Quad sets in sitting, supine or standing goal: 1 x 10 seconds x 10 reps bilaterally, gluteal sets 1 x 10 seconds x 5 reps x 2-3 times per day, Isometric lower abdominal strengthening 1 x 5->10 seconds x 10 reps x 2-3 times per day. Dancing with squats, lunges for endurance and develop an indep home program using light weights, reps and functional activities.   Updates to plan of care discharge   Total Session Time   Timed Code Treatment Minutes 44   Total Treatment Time (sum of timed and untimed services) 44

## 2023-08-22 ENCOUNTER — TELEPHONE (OUTPATIENT)
Dept: PHYSICAL THERAPY | Facility: CLINIC | Age: 68
End: 2023-08-22
Payer: COMMERCIAL

## 2024-01-17 ENCOUNTER — TELEPHONE (OUTPATIENT)
Dept: FAMILY MEDICINE | Facility: OTHER | Age: 69
End: 2024-01-17
Payer: COMMERCIAL

## 2024-01-17 NOTE — TELEPHONE ENCOUNTER
Reason for Call:  Appointment Request    Patient requesting this type of appt: Chronic Diease Management/Medication/Follow-Up    Requested provider: Omar Hand    Reason patient unable to be scheduled: Not within requested timeframe    When does patient want to be seen/preferred time:  as soon as possible     Comments:     Okay to leave a detailed message?: Yes at Cell number on file:    Telephone Information:   Mobile [phone] or [phone]       Call taken on 1/17/2024 at 1:44 PM by Rhonda Figueroa

## 2024-01-31 ENCOUNTER — OFFICE VISIT (OUTPATIENT)
Dept: FAMILY MEDICINE | Facility: CLINIC | Age: 69
End: 2024-01-31
Payer: COMMERCIAL

## 2024-01-31 VITALS
HEART RATE: 66 BPM | OXYGEN SATURATION: 98 % | BODY MASS INDEX: 39.12 KG/M2 | DIASTOLIC BLOOD PRESSURE: 74 MMHG | RESPIRATION RATE: 18 BRPM | HEIGHT: 64 IN | WEIGHT: 229.13 LBS | SYSTOLIC BLOOD PRESSURE: 144 MMHG | TEMPERATURE: 97.9 F

## 2024-01-31 DIAGNOSIS — I10 HYPERTENSION GOAL BP (BLOOD PRESSURE) < 140/90: Primary | ICD-10-CM

## 2024-01-31 DIAGNOSIS — Z87.39 PERSONAL HISTORY OF GOUT: ICD-10-CM

## 2024-01-31 DIAGNOSIS — Z12.11 SCREEN FOR COLON CANCER: ICD-10-CM

## 2024-01-31 DIAGNOSIS — Z23 NEED FOR SHINGLES VACCINE: ICD-10-CM

## 2024-01-31 DIAGNOSIS — Z23 NEED FOR VACCINATION: ICD-10-CM

## 2024-01-31 DIAGNOSIS — E66.01 MORBID OBESITY (H): ICD-10-CM

## 2024-01-31 DIAGNOSIS — Z29.11 NEED FOR VACCINATION AGAINST RESPIRATORY SYNCYTIAL VIRUS: ICD-10-CM

## 2024-01-31 LAB
ANION GAP SERPL CALCULATED.3IONS-SCNC: 16 MMOL/L (ref 7–15)
BUN SERPL-MCNC: 15.8 MG/DL (ref 8–23)
CALCIUM SERPL-MCNC: 9.8 MG/DL (ref 8.8–10.2)
CHLORIDE SERPL-SCNC: 101 MMOL/L (ref 98–107)
CREAT SERPL-MCNC: 0.81 MG/DL (ref 0.51–0.95)
DEPRECATED HCO3 PLAS-SCNC: 24 MMOL/L (ref 22–29)
EGFRCR SERPLBLD CKD-EPI 2021: 80 ML/MIN/1.73M2
GLUCOSE SERPL-MCNC: 105 MG/DL (ref 70–99)
POTASSIUM SERPL-SCNC: 3.8 MMOL/L (ref 3.4–5.3)
SODIUM SERPL-SCNC: 141 MMOL/L (ref 135–145)
URATE SERPL-MCNC: 8.4 MG/DL (ref 2.4–5.7)

## 2024-01-31 PROCEDURE — 80048 BASIC METABOLIC PNL TOTAL CA: CPT | Performed by: FAMILY MEDICINE

## 2024-01-31 PROCEDURE — 84550 ASSAY OF BLOOD/URIC ACID: CPT | Performed by: FAMILY MEDICINE

## 2024-01-31 PROCEDURE — G0008 ADMIN INFLUENZA VIRUS VAC: HCPCS | Performed by: FAMILY MEDICINE

## 2024-01-31 PROCEDURE — 99214 OFFICE O/P EST MOD 30 MIN: CPT | Mod: 25 | Performed by: FAMILY MEDICINE

## 2024-01-31 PROCEDURE — 36415 COLL VENOUS BLD VENIPUNCTURE: CPT | Performed by: FAMILY MEDICINE

## 2024-01-31 PROCEDURE — 90662 IIV NO PRSV INCREASED AG IM: CPT | Performed by: FAMILY MEDICINE

## 2024-01-31 RX ORDER — LISINOPRIL 40 MG/1
40 TABLET ORAL DAILY
Qty: 90 TABLET | Refills: 3 | Status: SHIPPED | OUTPATIENT
Start: 2024-01-31 | End: 2024-08-13

## 2024-01-31 RX ORDER — RESPIRATORY SYNCYTIAL VIRUS VACCINE 120MCG/0.5
0.5 KIT INTRAMUSCULAR ONCE
Qty: 1 EACH | Refills: 0 | Status: CANCELLED | OUTPATIENT
Start: 2024-01-31 | End: 2024-01-31

## 2024-01-31 RX ORDER — HYDROCHLOROTHIAZIDE 25 MG/1
25 TABLET ORAL DAILY
Qty: 90 TABLET | Refills: 3 | Status: SHIPPED | OUTPATIENT
Start: 2024-01-31

## 2024-01-31 NOTE — RESULT ENCOUNTER NOTE
Patient prefers results mailed.    Your uric acid levels are significantly elevated.  Because of this, I would recommend stopping hydrochlorothiazide and starting amlodipine instead.  Alternatively, we can add a uric acid lowering medication like allopurinol to reduce uric acid levels and reduce your risk of gout.  Let me know what you would like to do.    Have a nice day!    Dr. Hand

## 2024-01-31 NOTE — PROGRESS NOTES
"  Assessment & Plan       ICD-10-CM    1. Hypertension goal BP (blood pressure) < 140/90  I10 BASIC METABOLIC PANEL     hydrochlorothiazide (HYDRODIURIL) 25 MG tablet     lisinopril (ZESTRIL) 40 MG tablet     BASIC METABOLIC PANEL      2. Morbid obesity (H)  E66.01       3. Personal history of gout  Z87.39 Uric acid     Uric acid      4. Screen for colon cancer  Z12.11 Fecal colorectal cancer screen (FIT)     Fecal colorectal cancer screen (FIT)      5. Need for shingles vaccine  Z23       6. Need for vaccination against respiratory syncytial virus  Z29.11       7. Need for vaccination  Z23 INFLUENZA VACCINE 65+ (FLUZONE HD)           1.  Not controlled today.  Discussed options with patient.  She feels this is not typical of her blood pressure control, so she would like to monitor for another few weeks.  If persistent elevation at that time, will consider adding amlodipine for better blood pressure control.  Will check monitoring labs today.  2.  Encouraged lifestyle modifications.  Will follow and assist with this as able.  3.  This was not previously known to me.  Patient has not had a gout flare for some time, but given the hydrochlorothiazide, I did recommend we check a uric acid level and consider stopping HCTZ if significantly elevated uric acid.  This could help to prevent future gout attacks.  We can consider adding another antihypertensive to offset the loss of the hydrochlorothiazide.  4.  Screening ordered.  5, 6, 7.  Immunized and discussed where she can get these given Medicare rules.      Portions of this note were completed using Dragon dictation software.  Although reviewed, there may be typographical and other inadvertent errors that remain.           Ordering of each unique test  Prescription drug management        BMI  Estimated body mass index is 39.33 kg/m  as calculated from the following:    Height as of this encounter: 1.626 m (5' 4\").    Weight as of this encounter: 103.9 kg (229 lb 2 oz). "   Weight management plan: Discussed healthy diet and exercise guidelines      Patient Instructions   Your blood pressure was high today.  Please come back in 1-4 weeks for a nurse visit blood pressure check.     If your BP is still elevated, we may want to try amlodipine for BP control.  Still take your other medications.    We will let you know your results as soon as we can.  If you have MyChart, you will be able to see your lab and imaging results shortly after they become available.  I will review these and let you know my interpretation, but this usually takes additional time.  If you have multiple labs, I usually wait until they are all back before sending a note about them unless something is worrisome.  If you do not have MyChart, we will let you know your lab results as soon as we can.  If they are normal, this can take up to a week.    If your uric acid level is high, I'll recommend stopping hydrochlorothiazide and starting an alternative.      Contact us or return if questions or concerns.    Have a nice day!    Dr. Yazan Carreon   Addis is a 68 year old, presenting for the following health issues:  Recheck Medication (/) and Hypertension        1/31/2024    10:05 AM   Additional Questions   Roomed by Carrol AGUIRRE MA     History of Present Illness       Hypertension: She presents for follow up of hypertension.  She does not check blood pressure  regularly outside of the clinic. Outside blood pressures have been over 140/90. She does not follow a low salt diet.     She eats 2-3 servings of fruits and vegetables daily.She consumes 0 sweetened beverage(s) daily.She exercises with enough effort to increase her heart rate 10 to 19 minutes per day.  She exercises with enough effort to increase her heart rate 4 days per week.   She is taking medications regularly.     Pt isn't checking her BP at home.  No side effects.      BP Readings from Last 6 Encounters:   01/31/24 (!) 144/74   02/17/23 124/70  "  02/14/22 132/80   12/03/20 138/78   10/03/19 136/78   04/04/19 138/70       PT really helped her last time.  Her knee is much better.  Able to do what she needs to do on a regular basis.                  Objective    BP (!) 144/74   Pulse 66   Temp 97.9  F (36.6  C) (Temporal)   Resp 18   Ht 1.626 m (5' 4\")   Wt 103.9 kg (229 lb 2 oz)   LMP 06/30/2003   SpO2 98%   BMI 39.33 kg/m    Body mass index is 39.33 kg/m .  Physical Exam   GENERAL: alert and no distress  NECK: no adenopathy, no asymmetry, masses, or scars  RESP: lungs clear to auscultation - no rales, rhonchi or wheezes  CV: regular rate and rhythm, normal S1 S2, no S3 or S4, no murmur, click or rub, no peripheral edema  ABDOMEN: soft, nontender, no hepatosplenomegaly, no masses and bowel sounds normal  MS: no gross musculoskeletal defects noted, no edema    Office Visit on 02/17/2023   Component Date Value Ref Range Status    Sodium 02/17/2023 140  136 - 145 mmol/L Final    Potassium 02/17/2023 4.0  3.4 - 5.3 mmol/L Final    Chloride 02/17/2023 99  98 - 107 mmol/L Final    Carbon Dioxide (CO2) 02/17/2023 28  22 - 29 mmol/L Final    Anion Gap 02/17/2023 13  7 - 15 mmol/L Final    Urea Nitrogen 02/17/2023 14.9  8.0 - 23.0 mg/dL Final    Creatinine 02/17/2023 0.79  0.51 - 0.95 mg/dL Final    Calcium 02/17/2023 9.7  8.8 - 10.2 mg/dL Final    Glucose 02/17/2023 101 (H)  70 - 99 mg/dL Final    Alkaline Phosphatase 02/17/2023 82  35 - 104 U/L Final    AST 02/17/2023 15  10 - 35 U/L Final    ALT 02/17/2023 19  10 - 35 U/L Final    Protein Total 02/17/2023 7.5  6.4 - 8.3 g/dL Final    Albumin 02/17/2023 4.1  3.5 - 5.2 g/dL Final    Bilirubin Total 02/17/2023 0.3  <=1.2 mg/dL Final    GFR Estimate 02/17/2023 82  >60 mL/min/1.73m2 Final    eGFR calculated using 2021 CKD-EPI equation.     Results for orders placed or performed in visit on 01/31/24   BASIC METABOLIC PANEL     Status: Abnormal   Result Value Ref Range    Sodium 141 135 - 145 mmol/L    Potassium " 3.8 3.4 - 5.3 mmol/L    Chloride 101 98 - 107 mmol/L    Carbon Dioxide (CO2) 24 22 - 29 mmol/L    Anion Gap 16 (H) 7 - 15 mmol/L    Urea Nitrogen 15.8 8.0 - 23.0 mg/dL    Creatinine 0.81 0.51 - 0.95 mg/dL    GFR Estimate 80 >60 mL/min/1.73m2    Calcium 9.8 8.8 - 10.2 mg/dL    Glucose 105 (H) 70 - 99 mg/dL   Uric acid     Status: Abnormal   Result Value Ref Range    Uric Acid 8.4 (H) 2.4 - 5.7 mg/dL     No results found for this or any previous visit (from the past 24 hour(s)).        Signed Electronically by: Omar Hand MD, MD

## 2024-01-31 NOTE — LETTER
February 1, 2024    Addis Ferreira  [address] [address] [place] [id number]      Dear ,    We are writing to inform you of your test results.  Your uric acid levels are significantly elevated.  Because of this, I would recommend stopping hydrochlorothiazide and starting amlodipine instead.  Alternatively, we can add a uric acid lowering medication like allopurinol to reduce uric acid levels and reduce your risk of gout.  Let me know what you would like to do.     Have a nice day!   Dr. Hand      Resulted Orders   BASIC METABOLIC PANEL   Result Value Ref Range    Sodium 141 135 - 145 mmol/L      Comment:      Reference intervals for this test were updated on 09/26/2023 to more accurately reflect our healthy population. There may be differences in the flagging of prior results with similar values performed with this method. Interpretation of those prior results can be made in the context of the updated reference intervals.     Potassium 3.8 3.4 - 5.3 mmol/L    Chloride 101 98 - 107 mmol/L    Carbon Dioxide (CO2) 24 22 - 29 mmol/L    Anion Gap 16 (H) 7 - 15 mmol/L    Urea Nitrogen 15.8 8.0 - 23.0 mg/dL    Creatinine 0.81 0.51 - 0.95 mg/dL    GFR Estimate 80 >60 mL/min/1.73m2    Calcium 9.8 8.8 - 10.2 mg/dL    Glucose 105 (H) 70 - 99 mg/dL   Uric acid   Result Value Ref Range    Uric Acid 8.4 (H) 2.4 - 5.7 mg/dL       If you have any questions or concerns, please call the clinic at the number listed above.       Sincerely,    Omar Hand MD

## 2024-01-31 NOTE — PATIENT INSTRUCTIONS
Your blood pressure was high today.  Please come back in 1-4 weeks for a nurse visit blood pressure check.     If your BP is still elevated, we may want to try amlodipine for BP control.  Still take your other medications.    We will let you know your results as soon as we can.  If you have MyChart, you will be able to see your lab and imaging results shortly after they become available.  I will review these and let you know my interpretation, but this usually takes additional time.  If you have multiple labs, I usually wait until they are all back before sending a note about them unless something is worrisome.  If you do not have MyChart, we will let you know your lab results as soon as we can.  If they are normal, this can take up to a week.    If your uric acid level is high, I'll recommend stopping hydrochlorothiazide and starting an alternative.      Contact us or return if questions or concerns.    Have a nice day!    Dr. Hand

## 2024-02-14 ENCOUNTER — PATIENT OUTREACH (OUTPATIENT)
Dept: CARE COORDINATION | Facility: CLINIC | Age: 69
End: 2024-02-14
Payer: COMMERCIAL

## 2024-03-13 ENCOUNTER — PATIENT OUTREACH (OUTPATIENT)
Dept: CARE COORDINATION | Facility: CLINIC | Age: 69
End: 2024-03-13
Payer: COMMERCIAL

## 2024-08-13 ENCOUNTER — TELEPHONE (OUTPATIENT)
Dept: FAMILY MEDICINE | Facility: OTHER | Age: 69
End: 2024-08-13
Payer: COMMERCIAL

## 2024-08-13 DIAGNOSIS — I10 HYPERTENSION GOAL BP (BLOOD PRESSURE) < 140/90: ICD-10-CM

## 2024-08-13 RX ORDER — LISINOPRIL 40 MG/1
40 TABLET ORAL DAILY
Qty: 100 TABLET | Refills: 1 | Status: SHIPPED | OUTPATIENT
Start: 2024-08-13

## 2024-08-13 NOTE — TELEPHONE ENCOUNTER
Patient has [facility] coverage and with this insurance plan, the patient is eligible to receive certain prescriptions as a 100-day supply at the 90-day supply cost.      Prescriptions updated to 100-day supply: lisinopril      Connie Gage, PharmD, [facility]  Population Health Pharmacist  [phone]

## 2024-09-11 ENCOUNTER — PATIENT OUTREACH (OUTPATIENT)
Dept: CARE COORDINATION | Facility: CLINIC | Age: 69
End: 2024-09-11
Payer: COMMERCIAL

## 2025-01-27 ENCOUNTER — PATIENT OUTREACH (OUTPATIENT)
Dept: CARE COORDINATION | Facility: CLINIC | Age: 70
End: 2025-01-27
Payer: COMMERCIAL

## 2025-02-12 ENCOUNTER — TELEPHONE (OUTPATIENT)
Dept: FAMILY MEDICINE | Facility: OTHER | Age: 70
End: 2025-02-12
Payer: COMMERCIAL

## 2025-02-12 DIAGNOSIS — I10 HYPERTENSION GOAL BP (BLOOD PRESSURE) < 140/90: ICD-10-CM

## 2025-02-12 RX ORDER — HYDROCHLOROTHIAZIDE 25 MG/1
25 TABLET ORAL DAILY
Qty: 90 TABLET | OUTPATIENT
Start: 2025-02-12

## 2025-02-12 RX ORDER — HYDROCHLOROTHIAZIDE 25 MG/1
25 TABLET ORAL DAILY
Qty: 50 TABLET | Refills: 0 | Status: SHIPPED | OUTPATIENT
Start: 2025-02-12

## 2025-02-12 RX ORDER — LISINOPRIL 40 MG/1
40 TABLET ORAL DAILY
Qty: 90 TABLET | OUTPATIENT
Start: 2025-02-12

## 2025-02-12 RX ORDER — LISINOPRIL 40 MG/1
40 TABLET ORAL DAILY
Qty: 50 TABLET | Refills: 0 | Status: SHIPPED | OUTPATIENT
Start: 2025-02-12

## 2025-02-12 NOTE — TELEPHONE ENCOUNTER
Patient requesting medications refills at this time, reports she has her appointment scheduled with PCP on 3/31/2025. She is requesting medication refill to get her to that appointment.    Medications pended below.    Routing to provider.    Kristan Mcmahan RN on 2/12/2025 at 7:42 AM

## 2025-03-31 ENCOUNTER — TELEPHONE (OUTPATIENT)
Dept: FAMILY MEDICINE | Facility: OTHER | Age: 70
End: 2025-03-31

## 2025-03-31 NOTE — TELEPHONE ENCOUNTER
Spoke to pt. She has enough meds to last her until next week. She does not want to reschedule for tomorrow even the earliest appt because she wants to leave town tomorrow. Offered other help and she declined.

## 2025-04-10 ENCOUNTER — OFFICE VISIT (OUTPATIENT)
Dept: FAMILY MEDICINE | Facility: CLINIC | Age: 70
End: 2025-04-10
Payer: COMMERCIAL

## 2025-04-10 VITALS
WEIGHT: 204.8 LBS | HEIGHT: 64 IN | DIASTOLIC BLOOD PRESSURE: 82 MMHG | OXYGEN SATURATION: 96 % | TEMPERATURE: 98.1 F | RESPIRATION RATE: 18 BRPM | BODY MASS INDEX: 34.97 KG/M2 | SYSTOLIC BLOOD PRESSURE: 130 MMHG | HEART RATE: 64 BPM

## 2025-04-10 DIAGNOSIS — E66.01 MORBID OBESITY (H): ICD-10-CM

## 2025-04-10 DIAGNOSIS — Z87.39 PERSONAL HISTORY OF GOUT: ICD-10-CM

## 2025-04-10 DIAGNOSIS — Z13.6 CARDIOVASCULAR SCREENING; LDL GOAL LESS THAN 160: ICD-10-CM

## 2025-04-10 DIAGNOSIS — Z12.11 SCREEN FOR COLON CANCER: ICD-10-CM

## 2025-04-10 DIAGNOSIS — Z12.31 VISIT FOR SCREENING MAMMOGRAM: ICD-10-CM

## 2025-04-10 DIAGNOSIS — N39.41 URGENCY INCONTINENCE: ICD-10-CM

## 2025-04-10 DIAGNOSIS — I10 HYPERTENSION GOAL BP (BLOOD PRESSURE) < 140/90: Primary | ICD-10-CM

## 2025-04-10 DIAGNOSIS — D23.71 DERMATOFIBROMA OF CALF, RIGHT: ICD-10-CM

## 2025-04-10 DIAGNOSIS — N95.2 ATROPHIC VAGINITIS: ICD-10-CM

## 2025-04-10 LAB
ALBUMIN SERPL BCG-MCNC: 4.7 G/DL (ref 3.5–5.2)
ALP SERPL-CCNC: 86 U/L (ref 40–150)
ALT SERPL W P-5'-P-CCNC: 17 U/L (ref 0–50)
ANION GAP SERPL CALCULATED.3IONS-SCNC: 11 MMOL/L (ref 7–15)
AST SERPL W P-5'-P-CCNC: 18 U/L (ref 0–45)
BILIRUB SERPL-MCNC: 0.3 MG/DL
BUN SERPL-MCNC: 19.3 MG/DL (ref 8–23)
CALCIUM SERPL-MCNC: 10.3 MG/DL (ref 8.8–10.4)
CHLORIDE SERPL-SCNC: 102 MMOL/L (ref 98–107)
CHOLEST SERPL-MCNC: 207 MG/DL
CREAT SERPL-MCNC: 0.89 MG/DL (ref 0.51–0.95)
EGFRCR SERPLBLD CKD-EPI 2021: 70 ML/MIN/1.73M2
FASTING STATUS PATIENT QL REPORTED: NO
FASTING STATUS PATIENT QL REPORTED: NO
GLUCOSE SERPL-MCNC: 95 MG/DL (ref 70–99)
HCO3 SERPL-SCNC: 27 MMOL/L (ref 22–29)
HDLC SERPL-MCNC: 45 MG/DL
LDLC SERPL CALC-MCNC: 130 MG/DL
NONHDLC SERPL-MCNC: 162 MG/DL
POTASSIUM SERPL-SCNC: 3.7 MMOL/L (ref 3.4–5.3)
PROT SERPL-MCNC: 7.7 G/DL (ref 6.4–8.3)
SODIUM SERPL-SCNC: 140 MMOL/L (ref 135–145)
TRIGL SERPL-MCNC: 159 MG/DL
URATE SERPL-MCNC: 8.3 MG/DL (ref 2.4–5.7)

## 2025-04-10 RX ORDER — HYDROCHLOROTHIAZIDE 25 MG/1
25 TABLET ORAL DAILY
Qty: 100 TABLET | Refills: 3 | Status: SHIPPED | OUTPATIENT
Start: 2025-04-10

## 2025-04-10 RX ORDER — LISINOPRIL 40 MG/1
40 TABLET ORAL DAILY
Qty: 100 TABLET | Refills: 3 | Status: SHIPPED | OUTPATIENT
Start: 2025-04-10

## 2025-04-10 RX ORDER — ESTRADIOL 0.1 MG/G
CREAM VAGINAL
Qty: 42.5 G | Refills: 1 | Status: SHIPPED | OUTPATIENT
Start: 2025-04-11

## 2025-04-10 ASSESSMENT — PAIN SCALES - GENERAL: PAINLEVEL_OUTOF10: NO PAIN (0)

## 2025-04-10 NOTE — RESULT ENCOUNTER NOTE
Your labs are stable to slightly improved other than your cholesterol.  This has worsened.  I would actually recommend starting cholesterol medication.  Let me know you would like that sent.    Have a nice day!    Dr. Hand      The 10-year ASCVD risk score (Rhina PARK, et al., 2019) is: 11.3%    Values used to calculate the score:      Age: 70 years      Sex: Female      Is Non- : No      Diabetic: No      Tobacco smoker: No      Systolic Blood Pressure: 130 mmHg      Is BP treated: Yes      HDL Cholesterol: 45 mg/dL      Total Cholesterol: 207 mg/dL

## 2025-04-10 NOTE — PROGRESS NOTES
Assessment & Plan       ICD-10-CM    1. Hypertension goal BP (blood pressure) < 140/90  I10 hydrochlorothiazide (HYDRODIURIL) 25 MG tablet     lisinopril (ZESTRIL) 40 MG tablet     Comprehensive metabolic panel (BMP + Alb, Alk Phos, ALT, AST, Total. Bili, TP)     Comprehensive metabolic panel (BMP + Alb, Alk Phos, ALT, AST, Total. Bili, TP)      2. Morbid obesity (H)  E66.01       3. Urgency incontinence  N39.41 estradiol (ESTRACE) 0.1 MG/GM vaginal cream      4. Atrophic vaginitis  N95.2 estradiol (ESTRACE) 0.1 MG/GM vaginal cream      5. Personal history of gout  Z87.39 Uric acid     Uric acid      6. Dermatofibroma of calf, right  D23.71       7. Screen for colon cancer  Z12.11 Fecal colorectal cancer screen (FIT)     Fecal colorectal cancer screen (FIT)      8. Visit for screening mammogram  Z12.31 MA Screening Bilateral w/ Moy      9. CARDIOVASCULAR SCREENING; LDL GOAL LESS THAN 160  Z13.6 Lipid panel reflex to direct LDL Non-fasting     Lipid panel reflex to direct LDL Non-fasting           1.  Currently under good control.  Will continue current regimen and check monitoring labs.  2.  Encouraged lifestyle modifications.  Patient has made some progress with weight loss.  Encouraged her in her calorie reduction efforts.  Will continue to follow and assist as able.  3, 4.  Patient reports good response to estradiol cream.  She would like to continue this.  Refill given.  Follow-up as needed.  5.  Patient denies any gout flares in the last year.  Will recheck uric acid and carefully monitor.  Would have low threshold for stopping her hydrochlorothiazide if needed.  Have opted not to do so since she has excellent blood pressure control and has not had any gout flares.  6.  Discussed the nature of these and options for management.  She will monitor for now.  Reassured her about the benign nature of these processes.  Also discussed signs or symptoms that would indicate it may not be a dermatofibroma.  7.   "Discussed options for screening.  She elected to proceed with FIT screening.  This was ordered.  8.  Screening ordered.  9.  Screening ordered.        Portions of this note were completed using [employer] AI and/or Dragon dictation software.  Verbal patient consent received prior to use of AI software.  Although reviewed, there may be typographical and other inadvertent errors that remain.               BMI  Estimated body mass index is 35.15 kg/m  as calculated from the following:    Height as of this encounter: 1.626 m (5' 4\").    Weight as of this encounter: 92.9 kg (204 lb 12.8 oz).   Weight management plan: Discussed healthy diet and exercise guidelines      Patient Instructions   Keep up the good work!    If you get a gout flare, we should probably stop your hydrochlorothiazide.  We can consider other BP medications if not staying controlled.      Get your mammogram and colon screening done.      Consider updating your other immunizations.    We will let you know your results as soon as we can.  If you have MyChart, you will be able to see your lab and imaging results shortly after they become available.  I will review these and let you know my interpretation, but this usually takes additional time.  If you have multiple labs, I usually wait until they are all back before sending a note about them unless something is worrisome.  If you do not have MyChart, we will let you know your lab results as soon as we can.  If they are normal, this can take up to a week.     Contact us or return if questions or concerns.    Have a nice day!    Dr. Yazan Carreon   Addis is a 70 year old, presenting for the following health issues:  Hypertension        4/10/2025     1:13 PM   Additional Questions   Roomed by Jourdan BINGHAM     History of Present Illness       Hypertension: She presents for follow up of hypertension.  She does not check blood pressure  regularly outside of the clinic. Outside blood pressures have been over " "140/90. She does not follow a low salt diet.     She eats 2-3 servings of fruits and vegetables daily.She consumes 0 sweetened beverage(s) daily.She exercises with enough effort to increase her heart rate 10 to 19 minutes per day.  She exercises with enough effort to increase her heart rate 3 or less days per week.   She is taking medications regularly.      Doing OK with BP and medications.      Has been working on losing weight.    Has been drinking beet juice.      Using vaginal estrogen for atrophy and urinary incontinence.  Working well.      Due for mammogram, and colon screening.                Objective    Pulse 64   Resp 18   Ht 1.626 m (5' 4\")   Wt 92.9 kg (204 lb 12.8 oz)   LMP 06/30/2003   SpO2 96%   BMI 35.15 kg/m    Body mass index is 35.15 kg/m .  Physical Exam   GENERAL: alert and no distress  EYES: Eyes grossly normal to inspection, PERRL and conjunctivae and sclerae normal  HENT: ear canals and TM's normal, nose and mouth without ulcers or lesions  NECK: no adenopathy, no asymmetry, masses, or scars  RESP: lungs clear to auscultation - no rales, rhonchi or wheezes  CV: regular rate and rhythm, normal S1 S2, no S3 or S4, no murmur, click or rub, no peripheral edema  ABDOMEN: soft, nontender, no hepatosplenomegaly, no masses and bowel sounds normal  MS: no gross musculoskeletal defects noted, no edema  SKIN: small dermatofibroma, measuring approximately 4-5 mm.    Office Visit on 01/31/2024   Component Date Value Ref Range Status    Sodium 01/31/2024 141  135 - 145 mmol/L Final    Reference intervals for this test were updated on 09/26/2023 to more accurately reflect our healthy population. There may be differences in the flagging of prior results with similar values performed with this method. Interpretation of those prior results can be made in the context of the updated reference intervals.     Potassium 01/31/2024 3.8  3.4 - 5.3 mmol/L Final    Chloride 01/31/2024 101  98 - 107 mmol/L Final "    Carbon Dioxide (CO2) 01/31/2024 24  22 - 29 mmol/L Final    Anion Gap 01/31/2024 16 (H)  7 - 15 mmol/L Final    Urea Nitrogen 01/31/2024 15.8  8.0 - 23.0 mg/dL Final    Creatinine 01/31/2024 0.81  0.51 - 0.95 mg/dL Final    GFR Estimate 01/31/2024 80  >60 mL/min/1.73m2 Final    Calcium 01/31/2024 9.8  8.8 - 10.2 mg/dL Final    Glucose 01/31/2024 105 (H)  70 - 99 mg/dL Final    Uric Acid 01/31/2024 8.4 (H)  2.4 - 5.7 mg/dL Final     No results found for any visits on 04/10/25.  No results found for this or any previous visit (from the past 24 hours).        Signed Electronically by: Omar Hand MD, MD

## 2025-04-10 NOTE — PATIENT INSTRUCTIONS
Keep up the good work!    If you get a gout flare, we should probably stop your hydrochlorothiazide.  We can consider other BP medications if not staying controlled.      Get your mammogram and colon screening done.      Consider updating your other immunizations.    We will let you know your results as soon as we can.  If you have MyChart, you will be able to see your lab and imaging results shortly after they become available.  I will review these and let you know my interpretation, but this usually takes additional time.  If you have multiple labs, I usually wait until they are all back before sending a note about them unless something is worrisome.  If you do not have MyChart, we will let you know your lab results as soon as we can.  If they are normal, this can take up to a week.     Contact us or return if questions or concerns.    Have a nice day!    Dr. Hand

## 2025-04-14 ENCOUNTER — HOSPITAL ENCOUNTER (OUTPATIENT)
Dept: MAMMOGRAPHY | Facility: CLINIC | Age: 70
Discharge: HOME | End: 2025-04-14
Attending: FAMILY MEDICINE | Admitting: FAMILY MEDICINE
Payer: COMMERCIAL

## 2025-04-14 DIAGNOSIS — Z12.31 VISIT FOR SCREENING MAMMOGRAM: ICD-10-CM

## 2025-04-14 PROCEDURE — 77067 SCR MAMMO BI INCL CAD: CPT

## 2025-04-14 PROCEDURE — 77063 BREAST TOMOSYNTHESIS BI: CPT

## 2025-05-07 LAB — HEMOCCULT STL QL IA: NEGATIVE
